# Patient Record
Sex: FEMALE | Race: WHITE | Employment: OTHER | URBAN - METROPOLITAN AREA
[De-identification: names, ages, dates, MRNs, and addresses within clinical notes are randomized per-mention and may not be internally consistent; named-entity substitution may affect disease eponyms.]

---

## 2017-01-14 ENCOUNTER — HOSPITAL ENCOUNTER (OUTPATIENT)
Dept: GENERAL RADIOLOGY | Facility: HOSPITAL | Age: 72
Discharge: HOME | End: 2017-01-14
Attending: FAMILY MEDICINE
Payer: MEDICAID

## 2017-01-14 DIAGNOSIS — M25.469 PAIN AND SWELLING OF KNEE: ICD-10-CM

## 2017-01-14 DIAGNOSIS — M25.569 PAIN AND SWELLING OF KNEE: ICD-10-CM

## 2017-01-14 PROCEDURE — 73560 X-RAY EXAM OF KNEE 1 OR 2: CPT

## 2017-02-11 ENCOUNTER — HOSPITAL ENCOUNTER (EMERGENCY)
Facility: HOSPITAL | Age: 72
Discharge: HOME | End: 2017-02-11
Attending: EMERGENCY MEDICINE
Payer: MEDICAID

## 2017-02-11 ENCOUNTER — APPOINTMENT (OUTPATIENT)
Dept: GENERAL RADIOLOGY | Facility: HOSPITAL | Age: 72
End: 2017-02-11
Attending: EMERGENCY MEDICINE
Payer: MEDICAID

## 2017-02-11 VITALS
BODY MASS INDEX: 27.34 KG/M2 | RESPIRATION RATE: 16 BRPM | HEIGHT: 63 IN | HEART RATE: 80 BPM | TEMPERATURE: 99 F | WEIGHT: 154.31 LBS | OXYGEN SATURATION: 95 % | SYSTOLIC BLOOD PRESSURE: 129 MMHG | DIASTOLIC BLOOD PRESSURE: 59 MMHG

## 2017-02-11 DIAGNOSIS — J40 BRONCHITIS: Primary | ICD-10-CM

## 2017-02-11 DIAGNOSIS — R09.82 ALLERGIC RHINITIS WITH POSTNASAL DRIP: ICD-10-CM

## 2017-02-11 DIAGNOSIS — J30.9 ALLERGIC RHINITIS WITH POSTNASAL DRIP: ICD-10-CM

## 2017-02-11 LAB
ALBUMIN SERPL BCP-MCNC: 3.6 G/DL (ref 3.5–4.8)
ALP SERPL-CCNC: 82 U/L (ref 32–100)
ALT SERPL-CCNC: 19 U/L (ref 14–54)
ANION GAP SERPL CALC-SCNC: 8 MMOL/L (ref 0–18)
AST SERPL-CCNC: 19 U/L (ref 15–41)
BASOPHILS # BLD: 0.1 K/UL (ref 0–0.2)
BASOPHILS NFR BLD: 1 %
BILIRUB DIRECT SERPL-MCNC: 0.2 MG/DL (ref 0–0.2)
BILIRUB SERPL-MCNC: 0.4 MG/DL (ref 0.3–1.2)
BUN SERPL-MCNC: 13 MG/DL (ref 8–20)
BUN/CREAT SERPL: 18.6 (ref 10–20)
CALCIUM SERPL-MCNC: 9.2 MG/DL (ref 8.5–10.5)
CHLORIDE SERPL-SCNC: 105 MMOL/L (ref 95–110)
CO2 SERPL-SCNC: 26 MMOL/L (ref 22–32)
CREAT SERPL-MCNC: 0.7 MG/DL (ref 0.5–1.5)
EOSINOPHIL # BLD: 0.3 K/UL (ref 0–0.7)
EOSINOPHIL NFR BLD: 3 %
ERYTHROCYTE [DISTWIDTH] IN BLOOD BY AUTOMATED COUNT: 12.8 % (ref 11–15)
GLUCOSE SERPL-MCNC: 157 MG/DL (ref 70–99)
HCT VFR BLD AUTO: 43.4 % (ref 35–48)
HGB BLD-MCNC: 14.6 G/DL (ref 12–16)
LYMPHOCYTES # BLD: 1.8 K/UL (ref 1–4)
LYMPHOCYTES NFR BLD: 19 %
MCH RBC QN AUTO: 29.8 PG (ref 27–32)
MCHC RBC AUTO-ENTMCNC: 33.8 G/DL (ref 32–37)
MCV RBC AUTO: 88.2 FL (ref 80–100)
MONOCYTES # BLD: 0.9 K/UL (ref 0–1)
MONOCYTES NFR BLD: 9 %
NEUTROPHILS # BLD AUTO: 6.4 K/UL (ref 1.8–7.7)
NEUTROPHILS NFR BLD: 69 %
OSMOLALITY UR CALC.SUM OF ELEC: 291 MOSM/KG (ref 275–295)
PLATELET # BLD AUTO: 219 K/UL (ref 140–400)
PMV BLD AUTO: 7.9 FL (ref 7.4–10.3)
POTASSIUM SERPL-SCNC: 3.3 MMOL/L (ref 3.3–5.1)
PROT SERPL-MCNC: 6.8 G/DL (ref 5.9–8.4)
RBC # BLD AUTO: 4.92 M/UL (ref 3.7–5.4)
SODIUM SERPL-SCNC: 139 MMOL/L (ref 136–144)
TROPONIN I SERPL-MCNC: 0.01 NG/ML (ref ?–0.03)
WBC # BLD AUTO: 9.4 K/UL (ref 4–11)

## 2017-02-11 PROCEDURE — 36415 COLL VENOUS BLD VENIPUNCTURE: CPT

## 2017-02-11 PROCEDURE — 80076 HEPATIC FUNCTION PANEL: CPT | Performed by: EMERGENCY MEDICINE

## 2017-02-11 PROCEDURE — 93010 ELECTROCARDIOGRAM REPORT: CPT | Performed by: EMERGENCY MEDICINE

## 2017-02-11 PROCEDURE — 71020 XR CHEST PA + LAT CHEST (CPT=71020): CPT

## 2017-02-11 PROCEDURE — 84484 ASSAY OF TROPONIN QUANT: CPT

## 2017-02-11 PROCEDURE — 85025 COMPLETE CBC W/AUTO DIFF WBC: CPT

## 2017-02-11 PROCEDURE — 99284 EMERGENCY DEPT VISIT MOD MDM: CPT

## 2017-02-11 PROCEDURE — 80048 BASIC METABOLIC PNL TOTAL CA: CPT

## 2017-02-11 PROCEDURE — 93005 ELECTROCARDIOGRAM TRACING: CPT

## 2017-02-11 RX ORDER — FLUTICASONE PROPIONATE 50 MCG
2 SPRAY, SUSPENSION (ML) NASAL DAILY
Qty: 16 G | Refills: 0 | Status: SHIPPED | OUTPATIENT
Start: 2017-02-11 | End: 2017-03-13

## 2017-02-11 RX ORDER — LORATADINE 10 MG/1
10 TABLET ORAL DAILY
Qty: 14 TABLET | Refills: 0 | Status: SHIPPED | OUTPATIENT
Start: 2017-02-11 | End: 2017-02-25

## 2017-02-11 RX ORDER — DOXYCYCLINE HYCLATE 100 MG/1
100 CAPSULE ORAL 2 TIMES DAILY
Qty: 14 CAPSULE | Refills: 0 | Status: SHIPPED | OUTPATIENT
Start: 2017-02-11 | End: 2017-02-18

## 2017-02-11 RX ORDER — BENZONATATE 100 MG/1
100 CAPSULE ORAL 3 TIMES DAILY PRN
Qty: 42 CAPSULE | Refills: 0 | Status: SHIPPED | OUTPATIENT
Start: 2017-02-11 | End: 2017-02-25

## 2017-02-11 NOTE — ED PROVIDER NOTES
Patient Seen in: [facility] AND [facility] Emergency Department    History   Patient presents with:  Chest Pain Angina (cardiovascular)  Cough/URI    Stated Complaint: sinus congestion, and buring to chest     HPI    67 yo F with PMH HTN, allergic rhinitis present above.    PSFH elements reviewed from today and agreed except as otherwise stated in HPI.     Physical Exam     ED Triage Vitals   BP 02/11/17 1604 125/72 mmHg   Pulse 02/11/17 1602 82   Resp 02/11/17 1602 18   Temp 02/11/17 1602 98.8 °F (37.1 °C)   Temp sr GREEN   RAINBOW DRAW LAVENDER TALL (BNP)   CBC W/ DIFFERENTIAL      EKG    Rate, intervals and axes as noted on EKG Report.   Rate: 79  Rhythm: Sinus Rhythm  Reading: NSR 79 without ST changes, diffuse precordial TWF unchanged from 12/29/16 as interpreted b close PCP followup.     Disposition and Plan     Clinical Impression:  Bronchitis  (primary encounter diagnosis)  Allergic rhinitis with postnasal drip    Disposition:  Discharge    Follow-up:  Your PCP    Schedule an appointment as soon as possible for a ro

## 2017-02-11 NOTE — ED INITIAL ASSESSMENT (HPI)
Pt hasnt been feeling well since her last ER visit in December pt had a sinus infection and UTI.  Pt now complains of URI symptoms and burning in her chest.

## 2017-02-28 NOTE — ED PROVIDER NOTES
Patient Seen in: [facility] AND [facility] Emergency Department    History   Patient presents with:   Other    Stated Complaint: sinus pressure x1 week, back pain x1 week    HPI    50-year-old female with a history of hypertension who is had multiple recent visit HPI.    Physical Exam       ED Triage Vitals   BP 02/28/17 0556 116/73 mmHg   Pulse 02/28/17 0556 69   Resp 02/28/17 0556 18   Temp 02/28/17 0556 98.8 °F (37.1 °C)   Temp src --    SpO2 02/28/17 0556 99 %   O2 Device 02/28/17 0556 None (Room air)       Cur daughter to call the primary care doctor today and arrange for further follow-up.   For her nonemergent conditions      Disposition and Plan     Clinical Impression:  Anxiety  (primary encounter diagnosis)  Nasal congestion with rhinorrhea    Disposition:

## 2017-02-28 NOTE — ED NOTES
Pt complaining of feeling weak and tired, epigastric pain and back pain.   Pt currently taking augmentin for a a sinus infection and uti, +cough

## 2017-09-25 NOTE — ED AVS SNAPSHOT
Addended by: CHANDANA BROWN on: 9/25/2017 10:41 AM     Modules accepted: Orders     [facility] Emergency Department    [address]     [address] [place] [address]    Phone:  [phone]    Fax:  [phone]           Derrick Webb   MRN: [id number]    Department:  [facility] Emergency Department   Date of Visit:  2/2 coverage and benefits available for follow-up care and referrals. If you have difficulty scheduling your follow-up appointment as directed, please call our  at [phone].      Si tiene problemas para programar bernardo jessica de seguimiento s different from what your Primary Care doctor has instructed you - please continue to take your medications as instructed by your Primary Care doctor until you can check with your doctor. Please bring the medication list to your next doctor's appointment. can help with your Affordable Care Act coverage, as well as all types of Medicaid plans. To get signed up and covered, please call [phone] and ask to get set up for an insurance coverage that is in-network with [facility]

## 2018-01-19 ENCOUNTER — APPOINTMENT (OUTPATIENT)
Dept: GENERAL RADIOLOGY | Facility: HOSPITAL | Age: 73
End: 2018-01-19
Attending: EMERGENCY MEDICINE
Payer: MEDICAID

## 2018-01-19 ENCOUNTER — HOSPITAL ENCOUNTER (EMERGENCY)
Facility: HOSPITAL | Age: 73
Discharge: HOME | End: 2018-01-19
Attending: EMERGENCY MEDICINE
Payer: MEDICAID

## 2018-01-19 ENCOUNTER — APPOINTMENT (OUTPATIENT)
Dept: CT IMAGING | Facility: HOSPITAL | Age: 73
End: 2018-01-19
Attending: EMERGENCY MEDICINE
Payer: MEDICAID

## 2018-01-19 VITALS
DIASTOLIC BLOOD PRESSURE: 75 MMHG | BODY MASS INDEX: 27.34 KG/M2 | TEMPERATURE: 98 F | RESPIRATION RATE: 16 BRPM | OXYGEN SATURATION: 96 % | WEIGHT: 154.31 LBS | HEART RATE: 66 BPM | HEIGHT: 63 IN | SYSTOLIC BLOOD PRESSURE: 130 MMHG

## 2018-01-19 DIAGNOSIS — R10.13 DYSPEPSIA: ICD-10-CM

## 2018-01-19 DIAGNOSIS — H93.12 TINNITUS OF LEFT EAR: ICD-10-CM

## 2018-01-19 DIAGNOSIS — M26.622 ARTHRALGIA OF LEFT TEMPOROMANDIBULAR JOINT: ICD-10-CM

## 2018-01-19 DIAGNOSIS — R42 VERTIGO: Primary | ICD-10-CM

## 2018-01-19 LAB
ANION GAP SERPL CALC-SCNC: 9 MMOL/L (ref 0–18)
BASOPHILS # BLD: 0.1 K/UL (ref 0–0.2)
BASOPHILS NFR BLD: 1 %
BUN SERPL-MCNC: 12 MG/DL (ref 8–20)
BUN/CREAT SERPL: 20.7 (ref 10–20)
CALCIUM SERPL-MCNC: 9.1 MG/DL (ref 8.5–10.5)
CHLORIDE SERPL-SCNC: 106 MMOL/L (ref 95–110)
CO2 SERPL-SCNC: 25 MMOL/L (ref 22–32)
CREAT SERPL-MCNC: 0.58 MG/DL (ref 0.5–1.5)
EOSINOPHIL # BLD: 0.1 K/UL (ref 0–0.7)
EOSINOPHIL NFR BLD: 2 %
ERYTHROCYTE [DISTWIDTH] IN BLOOD BY AUTOMATED COUNT: 12.9 % (ref 11–15)
GLUCOSE SERPL-MCNC: 101 MG/DL (ref 70–99)
HCT VFR BLD AUTO: 42 % (ref 35–48)
HGB BLD-MCNC: 14.2 G/DL (ref 12–16)
LYMPHOCYTES # BLD: 2.1 K/UL (ref 1–4)
LYMPHOCYTES NFR BLD: 27 %
MCH RBC QN AUTO: 30.2 PG (ref 27–32)
MCHC RBC AUTO-ENTMCNC: 33.9 G/DL (ref 32–37)
MCV RBC AUTO: 89.1 FL (ref 80–100)
MONOCYTES # BLD: 0.7 K/UL (ref 0–1)
MONOCYTES NFR BLD: 9 %
NEUTROPHILS # BLD AUTO: 5 K/UL (ref 1.8–7.7)
NEUTROPHILS NFR BLD: 62 %
OSMOLALITY UR CALC.SUM OF ELEC: 290 MOSM/KG (ref 275–295)
PLATELET # BLD AUTO: 256 K/UL (ref 140–400)
PMV BLD AUTO: 7.8 FL (ref 7.4–10.3)
POTASSIUM SERPL-SCNC: 3.3 MMOL/L (ref 3.3–5.1)
RBC # BLD AUTO: 4.71 M/UL (ref 3.7–5.4)
SODIUM SERPL-SCNC: 140 MMOL/L (ref 136–144)
TROPONIN I SERPL-MCNC: 0 NG/ML (ref ?–0.03)
WBC # BLD AUTO: 8.1 K/UL (ref 4–11)

## 2018-01-19 PROCEDURE — 93005 ELECTROCARDIOGRAM TRACING: CPT

## 2018-01-19 PROCEDURE — 71046 X-RAY EXAM CHEST 2 VIEWS: CPT | Performed by: EMERGENCY MEDICINE

## 2018-01-19 PROCEDURE — 70450 CT HEAD/BRAIN W/O DYE: CPT | Performed by: EMERGENCY MEDICINE

## 2018-01-19 PROCEDURE — 80048 BASIC METABOLIC PNL TOTAL CA: CPT | Performed by: EMERGENCY MEDICINE

## 2018-01-19 PROCEDURE — 36415 COLL VENOUS BLD VENIPUNCTURE: CPT

## 2018-01-19 PROCEDURE — 93010 ELECTROCARDIOGRAM REPORT: CPT | Performed by: EMERGENCY MEDICINE

## 2018-01-19 PROCEDURE — 85025 COMPLETE CBC W/AUTO DIFF WBC: CPT | Performed by: EMERGENCY MEDICINE

## 2018-01-19 PROCEDURE — 99285 EMERGENCY DEPT VISIT HI MDM: CPT

## 2018-01-19 PROCEDURE — 84484 ASSAY OF TROPONIN QUANT: CPT | Performed by: EMERGENCY MEDICINE

## 2018-01-19 RX ORDER — AMOXICILLIN AND CLAVULANATE POTASSIUM 875; 125 MG/1; MG/1
1 TABLET, FILM COATED ORAL 2 TIMES DAILY
Qty: 20 TABLET | Refills: 0 | Status: SHIPPED | OUTPATIENT
Start: 2018-01-19 | End: 2018-01-19

## 2018-01-19 RX ORDER — FLUTICASONE PROPIONATE 50 MCG
2 SPRAY, SUSPENSION (ML) NASAL DAILY
Qty: 16 G | Refills: 0 | Status: SHIPPED | OUTPATIENT
Start: 2018-01-19 | End: 2018-02-18

## 2018-01-19 RX ORDER — PANTOPRAZOLE SODIUM 40 MG/1
40 TABLET, DELAYED RELEASE ORAL DAILY
Qty: 14 TABLET | Refills: 0 | Status: SHIPPED | OUTPATIENT
Start: 2018-01-19 | End: 2018-02-02

## 2018-01-19 RX ORDER — MECLIZINE HYDROCHLORIDE 25 MG/1
25 TABLET ORAL 3 TIMES DAILY PRN
Qty: 20 TABLET | Refills: 0 | Status: SHIPPED | OUTPATIENT
Start: 2018-01-19

## 2018-01-19 RX ORDER — ONDANSETRON 4 MG/1
4 TABLET, FILM COATED ORAL EVERY 8 HOURS PRN
Qty: 10 TABLET | Refills: 0 | Status: SHIPPED | OUTPATIENT
Start: 2018-01-19 | End: 2018-03-12 | Stop reason: ALTCHOICE

## 2018-01-19 RX ORDER — TRAMADOL HYDROCHLORIDE 50 MG/1
TABLET ORAL EVERY 8 HOURS PRN
Qty: 16 TABLET | Refills: 0 | Status: SHIPPED | OUTPATIENT
Start: 2018-01-19 | End: 2018-01-26

## 2018-01-19 NOTE — ED PROVIDER NOTES
Patient Seen in: [facility] AND [facility] Emergency Department    History   No chief complaint on file. Stated Complaint: Chest Pain    HPI    History obtained with the patient's daughter interpreting. Patient's speaks [place].     34-year-old female presen 97.9 °F (36.6 °C)  Temp src: Temporal  SpO2: 95 %  O2 Device: None (Room air)    Current:/75   Pulse 66   Temp 97.9 °F (36.6 °C) (Temporal)   Resp 16   Ht 160 cm (5' 3\")   Wt 70 kg   SpO2 96%   BMI 27.34 kg/m²         Physical Exam    Gen: Awake jignesh 73  Rhythm: Sinus Rhythm  Reading: Nonspecific T-wave changes unchanged from previous. ED Course as of Jan 19 1955  ------------------------------------------------------------       MDM     Patient resting comfortably. All symptoms resolved.   Antolin Petty

## 2018-01-19 NOTE — ED INITIAL ASSESSMENT (HPI)
\"Burning\" chest pain, dizziness, sinus pressure, \"losing balance\" and back pain since yesterday.

## 2018-01-19 NOTE — ED NOTES
Patient complaining of a \"burning in her chest,\" blurry vision and sinus pressure that began yesterday. {atient alert and oriented.

## 2018-02-27 NOTE — H&P
[address] [facility] [address] Gastroenterology                                                                                                  Clinic History and Physical     Pa No smoking or alcohol history. No regular NSAID use. The patient's daughter reports that the patient had sinus surgery in the past.  No specifics available in this regard. No history of chronic respiratory conditions.     The patient's daughter also re mouth 3 (three) times daily as needed. Disp: 20 tablet Rfl: 0   diazepam 5 MG Oral Tab Take 1 tablet (5 mg total) by mouth every 12 (twelve) hours as needed for Anxiety.  Disp: 6 tablet Rfl: 0   clotrimazole-betamethasone (LOTRISONE) 1-0.05 % Apply External has a normal mood and affect, behavior is normal    Nursing note and vitals reviewed    Labs/Imaging:     Patient's labs and imaging were reviewed and discussed with patient today. See HPI and A&P for further details.      .  ASSESSMENT/PLAN:   Yordy Cuadra w/ possible biopsy/dilation w/ Dr. Immanuel Marsh with MAC  -Anti-platelets and anti-coagulants: Denies  -Diabetes and hypertensive meds: Continue BP Rx as prescribed    EGD consent: I have discussed the risks, benefits, and alternatives to upper endoscopy/enterosc

## 2018-02-28 ENCOUNTER — TELEPHONE (OUTPATIENT)
Dept: GASTROENTEROLOGY | Facility: CLINIC | Age: 73
End: 2018-02-28

## 2018-02-28 ENCOUNTER — OFFICE VISIT (OUTPATIENT)
Dept: GASTROENTEROLOGY | Facility: CLINIC | Age: 73
End: 2018-02-28

## 2018-02-28 VITALS
HEIGHT: 62 IN | BODY MASS INDEX: 26.68 KG/M2 | DIASTOLIC BLOOD PRESSURE: 78 MMHG | WEIGHT: 145 LBS | HEART RATE: 84 BPM | SYSTOLIC BLOOD PRESSURE: 115 MMHG

## 2018-02-28 DIAGNOSIS — K21.9 GASTROESOPHAGEAL REFLUX DISEASE, ESOPHAGITIS PRESENCE NOT SPECIFIED: ICD-10-CM

## 2018-02-28 DIAGNOSIS — R05.8 PRODUCTIVE COUGH: ICD-10-CM

## 2018-02-28 DIAGNOSIS — R10.13 EPIGASTRIC PAIN: Primary | ICD-10-CM

## 2018-02-28 DIAGNOSIS — R19.8 GLOBUS SENSATION: ICD-10-CM

## 2018-02-28 DIAGNOSIS — R10.84 GENERALIZED ABDOMINAL PAIN: Primary | ICD-10-CM

## 2018-02-28 DIAGNOSIS — R10.13 EPIGASTRIC PAIN: ICD-10-CM

## 2018-02-28 PROCEDURE — 99244 OFF/OP CNSLTJ NEW/EST MOD 40: CPT | Performed by: NURSE PRACTITIONER

## 2018-02-28 PROCEDURE — 99212 OFFICE O/P EST SF 10 MIN: CPT | Performed by: NURSE PRACTITIONER

## 2018-02-28 RX ORDER — OMEPRAZOLE 40 MG/1
40 CAPSULE, DELAYED RELEASE ORAL DAILY
COMMUNITY
End: 2018-02-28

## 2018-02-28 RX ORDER — SUCRALFATE 1 G/1
1 TABLET ORAL
Qty: 90 TABLET | Refills: 1 | Status: SHIPPED | OUTPATIENT
Start: 2018-02-28 | End: 2018-03-30

## 2018-02-28 RX ORDER — OMEPRAZOLE 40 MG/1
40 CAPSULE, DELAYED RELEASE ORAL 2 TIMES DAILY
Qty: 60 CAPSULE | Refills: 2 | Status: SHIPPED | OUTPATIENT
Start: 2018-02-28 | End: 2018-03-30

## 2018-02-28 NOTE — TELEPHONE ENCOUNTER
Scheduled for:  EGD w/poss dil 77026  Provider Name: Dr. Celestina Cerna  Date:  3/7/18  Location:  [facility]  Sedation:  MAC  Time:  11:00 am, arrival 10:00 am  Prep: Clear liquids after midnight; NPO after 8:00 am  Meds/Allergies Reconciled?:  GALO Garibay reviewed

## 2018-02-28 NOTE — PATIENT INSTRUCTIONS
1. Schedule upper endoscopy (EGD) w/ possible dilation and biopsy with Dr. Karen Loya w/ MAC  2. Increase omeprazole 40 twice daily  3. Dissolve Carafate in water up to 4x day as needed for abdominal pain   4.   Complete CT of the abdomen

## 2018-03-05 ENCOUNTER — APPOINTMENT (OUTPATIENT)
Dept: GENERAL RADIOLOGY | Facility: HOSPITAL | Age: 73
End: 2018-03-05
Attending: EMERGENCY MEDICINE
Payer: MEDICAID

## 2018-03-05 PROCEDURE — 71046 X-RAY EXAM CHEST 2 VIEWS: CPT | Performed by: EMERGENCY MEDICINE

## 2018-03-05 NOTE — ED INITIAL ASSESSMENT (HPI)
Pt w/ multiple complaints including weakness, abd pain, pain that starts at the back of her head and radiates down throughout her body. N/t to hands/feet.  Pt pt's daughter, symptoms started Saturday however she has had similar episodes multiple times since

## 2018-03-05 NOTE — ED PROVIDER NOTES
Patient Seen in: [facility] AND [facility] Emergency Department    History   Patient presents with:  Weakness    Stated Complaint:     HPI    Patient presents with complaint of multiple complaints.   Patient has been here a few times in the past year for similar total) by mouth every 8 (eight) hours as needed for Nausea. Meclizine HCl 25 MG Oral Tab,  Take 1 tablet (25 mg total) by mouth 3 (three) times daily as needed.    diazepam 5 MG Oral Tab,  Take 1 tablet (5 mg total) by mouth every 12 (twelve) hours as nee normal, no lesions. HEENT: Oropharynx shows no redness no pus tolerating secretions tympanic membranes no redness no bulging neck is supple without stridor  Cardiovascular:  Normal S1 and S2, no murmur, regular, with good peripheral perfusion.   Respirator DIFFERENTIAL WITH PLATELET    Narrative: The following orders were created for panel order CBC WITH DIFFERENTIAL WITH PLATELET.   Procedure                               Abnormality         Status                     ---------

## 2018-03-07 ENCOUNTER — HOSPITAL ENCOUNTER (OUTPATIENT)
Facility: HOSPITAL | Age: 73
Setting detail: HOSPITAL OUTPATIENT SURGERY
Discharge: HOME | End: 2018-03-07
Attending: INTERNAL MEDICINE | Admitting: INTERNAL MEDICINE
Payer: MEDICAID

## 2018-03-07 ENCOUNTER — ANESTHESIA EVENT (OUTPATIENT)
Dept: ENDOSCOPY | Facility: HOSPITAL | Age: 73
End: 2018-03-07

## 2018-03-07 ENCOUNTER — ANESTHESIA (OUTPATIENT)
Dept: ENDOSCOPY | Facility: HOSPITAL | Age: 73
End: 2018-03-07

## 2018-03-07 ENCOUNTER — SURGERY (OUTPATIENT)
Age: 73
End: 2018-03-07

## 2018-03-07 DIAGNOSIS — K31.9 GASTRIC ERYTHEMA: Primary | ICD-10-CM

## 2018-03-07 DIAGNOSIS — K21.9 GASTROESOPHAGEAL REFLUX DISEASE, ESOPHAGITIS PRESENCE NOT SPECIFIED: ICD-10-CM

## 2018-03-07 DIAGNOSIS — K44.9 HIATAL HERNIA WITHOUT GANGRENE AND OBSTRUCTION: ICD-10-CM

## 2018-03-07 DIAGNOSIS — R05.8 PRODUCTIVE COUGH: ICD-10-CM

## 2018-03-07 DIAGNOSIS — R19.8 GLOBUS SENSATION: ICD-10-CM

## 2018-03-07 DIAGNOSIS — R10.13 EPIGASTRIC PAIN: ICD-10-CM

## 2018-03-07 PROCEDURE — 0D718ZZ DILATION OF UPPER ESOPHAGUS, VIA NATURAL OR ARTIFICIAL OPENING ENDOSCOPIC: ICD-10-PCS | Performed by: INTERNAL MEDICINE

## 2018-03-07 PROCEDURE — 43239 EGD BIOPSY SINGLE/MULTIPLE: CPT | Performed by: INTERNAL MEDICINE

## 2018-03-07 PROCEDURE — 0DB68ZX EXCISION OF STOMACH, VIA NATURAL OR ARTIFICIAL OPENING ENDOSCOPIC, DIAGNOSTIC: ICD-10-PCS | Performed by: INTERNAL MEDICINE

## 2018-03-07 PROCEDURE — 43248 EGD GUIDE WIRE INSERTION: CPT | Performed by: INTERNAL MEDICINE

## 2018-03-07 PROCEDURE — 0DB98ZX EXCISION OF DUODENUM, VIA NATURAL OR ARTIFICIAL OPENING ENDOSCOPIC, DIAGNOSTIC: ICD-10-PCS | Performed by: INTERNAL MEDICINE

## 2018-03-07 PROCEDURE — 0DB18ZX EXCISION OF UPPER ESOPHAGUS, VIA NATURAL OR ARTIFICIAL OPENING ENDOSCOPIC, DIAGNOSTIC: ICD-10-PCS | Performed by: INTERNAL MEDICINE

## 2018-03-07 RX ORDER — LIDOCAINE HYDROCHLORIDE 10 MG/ML
INJECTION, SOLUTION EPIDURAL; INFILTRATION; INTRACAUDAL; PERINEURAL AS NEEDED
Status: DISCONTINUED | OUTPATIENT
Start: 2018-03-07 | End: 2018-03-07 | Stop reason: SURG

## 2018-03-07 RX ORDER — SODIUM CHLORIDE, SODIUM LACTATE, POTASSIUM CHLORIDE, CALCIUM CHLORIDE 600; 310; 30; 20 MG/100ML; MG/100ML; MG/100ML; MG/100ML
INJECTION, SOLUTION INTRAVENOUS CONTINUOUS
Status: DISCONTINUED | OUTPATIENT
Start: 2018-03-07 | End: 2018-03-07

## 2018-03-07 RX ADMIN — SODIUM CHLORIDE, SODIUM LACTATE, POTASSIUM CHLORIDE, CALCIUM CHLORIDE: 600; 310; 30; 20 INJECTION, SOLUTION INTRAVENOUS at 11:18:00

## 2018-03-07 RX ADMIN — LIDOCAINE HYDROCHLORIDE 100 MG: 10 INJECTION, SOLUTION EPIDURAL; INFILTRATION; INTRACAUDAL; PERINEURAL at 11:18:00

## 2018-03-07 RX ADMIN — SODIUM CHLORIDE, SODIUM LACTATE, POTASSIUM CHLORIDE, CALCIUM CHLORIDE: 600; 310; 30; 20 INJECTION, SOLUTION INTRAVENOUS at 11:36:00

## 2018-03-07 NOTE — H&P
History & Physical Examination    Patient Name: Maritza Johnson  MRN: [id number]  CSN: [id number]  YOB: 1945    Diagnosis: GERD, dyspepsia, epigastric pain, globus sensation      Prescriptions Prior to Admission:  LORazepam 0.5 MG Oral Tab T status: Never Smoker    Smokeless tobacco: Never Used    Alcohol use No       SYSTEM Check if Physical Exam is Normal If not normal, please explain:   NATHALIE [ Leila Walsh [ Hoang Moody [ Carmel Hoang [ Petra Grief [ X]      I have disc

## 2018-03-07 NOTE — OPERATIVE REPORT
ESOPHAGOGASTRODUODENOSCOPY (EGD) REPORT    Mally Coombs     3/19/1945 Age 67year old   PCP Khari French MD Endoscopist Terrie Munoz MD     Date of procedure: 18    Procedure: EGD w/cold biopsy and guidewire (Savary) dilatation    Pr folds were seen. The pylorus was patent. The gastric mucosa appeared mildly erythematous s/p random biopsies. Retroflexion revealed a normal fundus and a mildly-patulous cardia.      3. Duodenum: The duodenal mucosa appeared normal in the 1st and 2nd portio

## 2018-03-07 NOTE — ANESTHESIA PREPROCEDURE EVALUATION
Anesthesia PreOp Note    HPI:     Aaron Prince is a 67year old female who presents for preoperative consultation requested by: Juana Han MD    Date of Surgery: 3/7/2018    Procedure(s):  ESOPHAGOGASTRODUODENOSCOPY (EGD)  Indication: Epigastric Cream Apply 1 Application topically 2 (two) times daily.  Disp: 45 g Rfl: 0 Not Taking       Current Facility-Administered Medications Ordered in Epic:  lactated ringers infusion  Intravenous Continuous Joe Lovelace MD     No current Epic-ordered outpat Cardiovascular - normal exam  (+) hypertension,     Neuro/Psych - negative ROS     GI/Hepatic/Renal    (+) GERD,     Endo/Other - negative ROS   Abdominal  - normal exam             Anesthesia Plan:   ASA:  2  Plan:   MAC  Post-op Pain Management: IV analg

## 2018-03-07 NOTE — ANESTHESIA POSTPROCEDURE EVALUATION
Patient: Carey Tavera    Procedure Summary     Date:  03/07/18 Room / Location:  [address] [place] [address] ENDOSCOPY 01 / [address] [place] [address] ENDOSCOPY    Anesthesia Start:  7816 Anesthesia Stop:  4877    Procedure:  ESOPHAGOGASTRODUODENOSCOPY (EGD) (N/A ) Diagnosis:       Epigastric pain

## 2018-03-08 VITALS
HEART RATE: 74 BPM | SYSTOLIC BLOOD PRESSURE: 128 MMHG | BODY MASS INDEX: 26.68 KG/M2 | WEIGHT: 145 LBS | OXYGEN SATURATION: 98 % | DIASTOLIC BLOOD PRESSURE: 92 MMHG | RESPIRATION RATE: 23 BRPM | HEIGHT: 62 IN

## 2018-03-09 ENCOUNTER — HOSPITAL ENCOUNTER (EMERGENCY)
Facility: HOSPITAL | Age: 73
Discharge: HOME | End: 2018-03-09
Attending: EMERGENCY MEDICINE
Payer: MEDICAID

## 2018-03-09 ENCOUNTER — APPOINTMENT (OUTPATIENT)
Dept: GENERAL RADIOLOGY | Facility: HOSPITAL | Age: 73
End: 2018-03-09
Payer: MEDICAID

## 2018-03-09 ENCOUNTER — APPOINTMENT (OUTPATIENT)
Dept: CT IMAGING | Facility: HOSPITAL | Age: 73
End: 2018-03-09
Attending: EMERGENCY MEDICINE
Payer: MEDICAID

## 2018-03-09 VITALS
RESPIRATION RATE: 18 BRPM | WEIGHT: 154.31 LBS | OXYGEN SATURATION: 97 % | SYSTOLIC BLOOD PRESSURE: 141 MMHG | TEMPERATURE: 97 F | DIASTOLIC BLOOD PRESSURE: 78 MMHG | HEART RATE: 72 BPM | BODY MASS INDEX: 30.29 KG/M2 | HEIGHT: 59.84 IN

## 2018-03-09 DIAGNOSIS — R55 VASOVAGAL EPISODE: ICD-10-CM

## 2018-03-09 DIAGNOSIS — R05.9 COUGH: Primary | ICD-10-CM

## 2018-03-09 DIAGNOSIS — N30.00 ACUTE CYSTITIS WITHOUT HEMATURIA: ICD-10-CM

## 2018-03-09 LAB
ANION GAP SERPL CALC-SCNC: 11 MMOL/L (ref 0–18)
BASOPHILS # BLD: 0.1 K/UL (ref 0–0.2)
BASOPHILS NFR BLD: 1 %
BILIRUB UR QL: NEGATIVE
BUN SERPL-MCNC: 10 MG/DL (ref 8–20)
BUN/CREAT SERPL: 13 (ref 10–20)
CALCIUM SERPL-MCNC: 9.4 MG/DL (ref 8.5–10.5)
CHLORIDE SERPL-SCNC: 105 MMOL/L (ref 95–110)
CO2 SERPL-SCNC: 25 MMOL/L (ref 22–32)
COLOR UR: YELLOW
CREAT SERPL-MCNC: 0.77 MG/DL (ref 0.5–1.5)
EOSINOPHIL # BLD: 0.1 K/UL (ref 0–0.7)
EOSINOPHIL NFR BLD: 1 %
ERYTHROCYTE [DISTWIDTH] IN BLOOD BY AUTOMATED COUNT: 12.7 % (ref 11–15)
GLUCOSE SERPL-MCNC: 112 MG/DL (ref 70–99)
GLUCOSE UR-MCNC: NEGATIVE MG/DL
HCT VFR BLD AUTO: 41 % (ref 35–48)
HGB BLD-MCNC: 13.9 G/DL (ref 12–16)
KETONES UR-MCNC: 20 MG/DL
LYMPHOCYTES # BLD: 1.7 K/UL (ref 1–4)
LYMPHOCYTES NFR BLD: 20 %
MAGNESIUM SERPL-MCNC: 2 MG/DL (ref 1.8–2.5)
MCH RBC QN AUTO: 29.8 PG (ref 27–32)
MCHC RBC AUTO-ENTMCNC: 33.9 G/DL (ref 32–37)
MCV RBC AUTO: 87.9 FL (ref 80–100)
MONOCYTES # BLD: 0.6 K/UL (ref 0–1)
MONOCYTES NFR BLD: 7 %
NEUTROPHILS # BLD AUTO: 6.2 K/UL (ref 1.8–7.7)
NEUTROPHILS NFR BLD: 71 %
NITRITE UR QL STRIP.AUTO: NEGATIVE
OSMOLALITY UR CALC.SUM OF ELEC: 292 MOSM/KG (ref 275–295)
PH UR: 8 [PH] (ref 5–8)
PLATELET # BLD AUTO: 226 K/UL (ref 140–400)
PMV BLD AUTO: 7.7 FL (ref 7.4–10.3)
POTASSIUM SERPL-SCNC: 3.5 MMOL/L (ref 3.3–5.1)
PROT UR-MCNC: NEGATIVE MG/DL
RBC # BLD AUTO: 4.67 M/UL (ref 3.7–5.4)
RBC #/AREA URNS AUTO: 2 /HPF
SODIUM SERPL-SCNC: 141 MMOL/L (ref 136–144)
SP GR UR STRIP: 1.01 (ref 1–1.03)
TROPONIN I SERPL-MCNC: 0.01 NG/ML (ref ?–0.03)
UROBILINOGEN UR STRIP-ACNC: <2
VIT C UR-MCNC: NEGATIVE MG/DL
WBC # BLD AUTO: 8.7 K/UL (ref 4–11)
WBC #/AREA URNS AUTO: 6 /HPF

## 2018-03-09 PROCEDURE — 70450 CT HEAD/BRAIN W/O DYE: CPT | Performed by: EMERGENCY MEDICINE

## 2018-03-09 PROCEDURE — 85025 COMPLETE CBC W/AUTO DIFF WBC: CPT | Performed by: EMERGENCY MEDICINE

## 2018-03-09 PROCEDURE — 85025 COMPLETE CBC W/AUTO DIFF WBC: CPT

## 2018-03-09 PROCEDURE — 71045 X-RAY EXAM CHEST 1 VIEW: CPT | Performed by: EMERGENCY MEDICINE

## 2018-03-09 PROCEDURE — 36415 COLL VENOUS BLD VENIPUNCTURE: CPT

## 2018-03-09 PROCEDURE — 99285 EMERGENCY DEPT VISIT HI MDM: CPT

## 2018-03-09 PROCEDURE — 83735 ASSAY OF MAGNESIUM: CPT | Performed by: EMERGENCY MEDICINE

## 2018-03-09 PROCEDURE — 80048 BASIC METABOLIC PNL TOTAL CA: CPT

## 2018-03-09 PROCEDURE — 81001 URINALYSIS AUTO W/SCOPE: CPT | Performed by: EMERGENCY MEDICINE

## 2018-03-09 PROCEDURE — 84484 ASSAY OF TROPONIN QUANT: CPT | Performed by: EMERGENCY MEDICINE

## 2018-03-09 PROCEDURE — 80048 BASIC METABOLIC PNL TOTAL CA: CPT | Performed by: EMERGENCY MEDICINE

## 2018-03-09 PROCEDURE — 93010 ELECTROCARDIOGRAM REPORT: CPT | Performed by: EMERGENCY MEDICINE

## 2018-03-09 PROCEDURE — 93005 ELECTROCARDIOGRAM TRACING: CPT

## 2018-03-09 PROCEDURE — 87086 URINE CULTURE/COLONY COUNT: CPT | Performed by: EMERGENCY MEDICINE

## 2018-03-09 RX ORDER — NITROFURANTOIN 25; 75 MG/1; MG/1
100 CAPSULE ORAL 2 TIMES DAILY
Qty: 10 CAPSULE | Refills: 0 | Status: SHIPPED | OUTPATIENT
Start: 2018-03-09 | End: 2018-03-14

## 2018-03-09 RX ORDER — DIAZEPAM 5 MG/1
5 TABLET ORAL EVERY 8 HOURS PRN
Qty: 15 TABLET | Refills: 0 | Status: SHIPPED | OUTPATIENT
Start: 2018-03-09 | End: 2018-03-12

## 2018-03-09 RX ORDER — NITROFURANTOIN 25; 75 MG/1; MG/1
100 CAPSULE ORAL ONCE
Status: COMPLETED | OUTPATIENT
Start: 2018-03-09 | End: 2018-03-09

## 2018-03-09 RX ORDER — BENZONATATE 100 MG/1
100 CAPSULE ORAL 3 TIMES DAILY PRN
Qty: 30 CAPSULE | Refills: 0 | Status: SHIPPED | OUTPATIENT
Start: 2018-03-09 | End: 2018-04-08

## 2018-03-09 NOTE — ED INITIAL ASSESSMENT (HPI)
Pt had a syncopal episode while walking to the couch. Denies hitting head. +LOC. Pt also reports sob. Denies cp. C/o headache and left leg numbness.

## 2018-03-10 NOTE — ED PROVIDER NOTES
Patient Seen in: [facility] AND [facility] Emergency Department    History   No chief complaint on file.     Stated Complaint: Syncope     HPI    66 yo F with PMH HTN, GERD, anxiety presenting for evaluation of chronic cough - markedly worse this afternoon while on No                Review of Systems :  Constitutional: As per HPI  Respiratory: (+) cough. Cardiovascular: Negative for chest pain and palpitations. Positive for stated complaint: Syncope  Other systems are as noted in HPI.   Constitutional and vital s Abnormality         Status                     ---------                               -----------         ------                     CBC W/ DIFFERENTIAL[[id number]]                              Final result                 Please view results denies head trauma. HA.  TECHNIQUE: CT images were obtained without contrast material.  Automated exposure control for dose reduction was used. FINDINGS:  CSF SPACES: Ventricles, cisterns, and sulci are appropriate for age.   No hydrocephalus, subarachnoi Approved by (CST): Lola Schmitt MD on 3/09/2018 at 17:55          CONCLUSION: Normal examination.           MDM     DIFFERENTIAL DIAGNOSIS: After history and physical exam differential diagnosis includes but is not limited to sinusitis, ICH, PNA,

## 2018-03-10 NOTE — ED NOTES
Pt arrives to ed48 from home w/ family for syncopal episode at home. Per pt's daughter, pt felt faint, and passed out while walking. Was assisted to ground. Pt's left arm/leg felt weak and numb after incident. CSS negative.  Pt w/ productive cough as well a

## 2018-03-12 ENCOUNTER — HOSPITAL ENCOUNTER (OUTPATIENT)
Dept: CT IMAGING | Facility: HOSPITAL | Age: 73
Discharge: HOME | End: 2018-03-12
Attending: NURSE PRACTITIONER
Payer: MEDICAID

## 2018-03-12 ENCOUNTER — TELEPHONE (OUTPATIENT)
Dept: GASTROENTEROLOGY | Facility: CLINIC | Age: 73
End: 2018-03-12

## 2018-03-12 DIAGNOSIS — R93.5 ABNORMAL CT OF THE ABDOMEN: Primary | ICD-10-CM

## 2018-03-12 DIAGNOSIS — R10.84 GENERALIZED ABDOMINAL PAIN: ICD-10-CM

## 2018-03-12 PROBLEM — N28.89 LEFT KIDNEY MASS: Status: ACTIVE | Noted: 2018-03-12

## 2018-03-12 PROBLEM — H93.12 SUBJECTIVE TINNITUS, LEFT: Status: ACTIVE | Noted: 2018-03-12

## 2018-03-12 PROCEDURE — 74177 CT ABD & PELVIS W/CONTRAST: CPT | Performed by: NURSE PRACTITIONER

## 2018-03-13 NOTE — TELEPHONE ENCOUNTER
Laurita Mendez      3/13/18 12:29 PM   Note      Daughter calling for results from CT SCAN of Abdomen taken yesterday.  Please call thank you [phone]

## 2018-03-13 NOTE — TELEPHONE ENCOUNTER
I spoke with the patient's daughter. She reports her mother is continuing PPI therapy as prescribed. She has stopped taking Carafate as it did not agree with her. She has been using Gaviscon as directed by Dr. Santosh Petty.   He has noticed some mild improvemen

## 2018-03-13 NOTE — PROGRESS NOTES
HPI:    Patient ID: Laura Martines is a 67year old female. Patient presents for follow up from ER visit and [address] [place] [address] 3/9/18 and  3/5/18 for panic-like symptoms. Her daughter is with her today and translating for patient.   She states patient has been unde daily. Disp: 10 capsule Rfl: 0   benzonatate 100 MG Oral Cap Take 1 capsule (100 mg total) by mouth 3 (three) times daily as needed for cough.  Disp: 30 capsule Rfl: 0   Omeprazole 40 MG Oral Capsule Delayed Release Take 1 capsule (40 mg total) by mouth 2 ( cervical adenopathy. Neurological: She is alert and oriented to person, place, and time. No cranial nerve deficit. Skin: Skin is warm and dry. Psychiatric: She has a normal mood and affect.  Her behavior is normal. Judgment and thought content normal.

## 2018-03-14 NOTE — TELEPHONE ENCOUNTER
Scheduled for:  Colonoscopy 84489  Provider Name: Dr. Don Hughes  Date:  4/18/18  Location:  [facility]  Sedation:  MAC  Time:  0830 (pt is aware to arrive at 0730)   Prep:  Colyte, mailed 3/15/18   Meds/Allergies Reconciled?:  Yeny/GALO reviewed   Diagnosis with cod

## 2018-03-15 ENCOUNTER — TELEPHONE (OUTPATIENT)
Dept: OTHER | Age: 73
End: 2018-03-15

## 2018-03-15 NOTE — TELEPHONE ENCOUNTER
Pt's daughter called stated she have some questions from [address] [place] [address] advised not in office but to take statement stated she will call tomorrow

## 2018-03-16 ENCOUNTER — OFFICE VISIT (OUTPATIENT)
Dept: AUDIOLOGY | Facility: CLINIC | Age: 73
End: 2018-03-16

## 2018-03-16 ENCOUNTER — OFFICE VISIT (OUTPATIENT)
Dept: OTOLARYNGOLOGY | Facility: CLINIC | Age: 73
End: 2018-03-16

## 2018-03-16 VITALS
SYSTOLIC BLOOD PRESSURE: 120 MMHG | DIASTOLIC BLOOD PRESSURE: 70 MMHG | WEIGHT: 147 LBS | TEMPERATURE: 97 F | HEIGHT: 62 IN | BODY MASS INDEX: 27.05 KG/M2

## 2018-03-16 DIAGNOSIS — H93.11 RINGING IN THE EAR, RIGHT: Primary | ICD-10-CM

## 2018-03-16 DIAGNOSIS — R05.9 COUGH: ICD-10-CM

## 2018-03-16 DIAGNOSIS — H90.3 SENSORINEURAL HEARING LOSS, BILATERAL: Primary | ICD-10-CM

## 2018-03-16 PROCEDURE — 92557 COMPREHENSIVE HEARING TEST: CPT | Performed by: AUDIOLOGIST

## 2018-03-16 PROCEDURE — 99243 OFF/OP CNSLTJ NEW/EST LOW 30: CPT | Performed by: OTOLARYNGOLOGY

## 2018-03-16 PROCEDURE — 92567 TYMPANOMETRY: CPT | Performed by: AUDIOLOGIST

## 2018-03-16 PROCEDURE — 99212 OFFICE O/P EST SF 10 MIN: CPT | Performed by: OTOLARYNGOLOGY

## 2018-03-16 RX ORDER — TRAMADOL HYDROCHLORIDE 50 MG/1
TABLET ORAL
Refills: 1 | COMMUNITY
Start: 2018-02-28

## 2018-03-16 RX ORDER — FLUTICASONE PROPIONATE 50 MCG
2 SPRAY, SUSPENSION (ML) NASAL DAILY
Qty: 1 BOTTLE | Refills: 3 | Status: SHIPPED | OUTPATIENT
Start: 2018-03-16

## 2018-03-16 RX ORDER — MONTELUKAST SODIUM 10 MG/1
10 TABLET ORAL NIGHTLY
Qty: 30 TABLET | Refills: 3 | Status: SHIPPED | OUTPATIENT
Start: 2018-03-16

## 2018-03-16 NOTE — PROGRESS NOTES
Yvette Eid is a 67year old female.  Patient presents with:  Sinus Problem: on and off sinus pressure for 1.5 year  Ringing In Ear: on and off ringing of the right ear    HPI:   She has had problems with pressure in the left side of her face extending sucralfate (CARAFATE) 1 g Oral Tab Take 1 tablet (1 g total) by mouth 4 (four) times daily before meals and nightly. Disp: 90 tablet Rfl: 1   Meclizine HCl 25 MG Oral Tab Take 1 tablet (25 mg total) by mouth 3 (three) times daily as needed.  Disp: 20 tabl Normal, Left: Normal.    Ears Normal Inspection - Right: Normal, Left: Normal. Canal - Left: Normal. TM - Right: Normal, Left: Normal.     ASSESSMENT AND PLAN:   1.  Ringing in the ear, right  She has some tinnitus in her ears with a mild high-frequency hea

## 2018-03-16 NOTE — PROGRESS NOTES
AUDIOLOGY REPORT      Emily Cagle is a 67year old female     Referring Provider: Gurinder    YOB: 1945  Medical Record: [id number]      Patient Hearing History:  Patient reported tinnitus. Test/Procedures:    The patient was joann

## 2018-03-16 NOTE — TELEPHONE ENCOUNTER
Spoke with patient's daughter. She saw ENT Dr. Debbie Zapata who started her on Singulair and was told symptoms may be due to allergies. She is doing well on Paxil and reports no side effects.   She did have panic attack last night and improved after taking diazep

## 2018-03-26 RX ORDER — PAROXETINE 10 MG/1
10 TABLET, FILM COATED ORAL EVERY MORNING
Qty: 30 TABLET | Refills: 0 | OUTPATIENT
Start: 2018-03-26

## 2018-04-10 ENCOUNTER — TELEPHONE (OUTPATIENT)
Dept: FAMILY MEDICINE CLINIC | Facility: CLINIC | Age: 73
End: 2018-04-10

## 2018-04-11 RX ORDER — PAROXETINE 10 MG/1
TABLET, FILM COATED ORAL
Qty: 30 TABLET | Refills: 0 | Status: SHIPPED | OUTPATIENT
Start: 2018-04-11

## 2018-04-11 NOTE — TELEPHONE ENCOUNTER
Spoke with daughter and states, \"My mom went back home to [place] for a few months and will get treatment there. She does not need refills right now. \"

## 2018-04-11 NOTE — TELEPHONE ENCOUNTER
Medication refilled for 90 days per protocol.     Please reply to pool: EM CALL [facility]--please schedule follow up with MANOJ Wright      Psychiatric Non-Scheduled (Anti-Anxiety) Passed4/10 3:59 PM   Appointment in last 6 or next 3 months

## 2021-03-08 DIAGNOSIS — Z23 NEED FOR VACCINATION: ICD-10-CM

## 2023-02-21 ENCOUNTER — OFFICE VISIT (OUTPATIENT)
Dept: INTERNAL MEDICINE CLINIC | Facility: CLINIC | Age: 78
End: 2023-02-21

## 2023-02-21 VITALS
HEART RATE: 75 BPM | OXYGEN SATURATION: 98 % | SYSTOLIC BLOOD PRESSURE: 146 MMHG | BODY MASS INDEX: 32.39 KG/M2 | HEIGHT: 62 IN | DIASTOLIC BLOOD PRESSURE: 80 MMHG | WEIGHT: 176 LBS

## 2023-02-21 DIAGNOSIS — R10.13 EPIGASTRIC PAIN: ICD-10-CM

## 2023-02-21 DIAGNOSIS — M17.0 BILATERAL PRIMARY OSTEOARTHRITIS OF KNEE: Primary | ICD-10-CM

## 2023-02-21 DIAGNOSIS — Z85.038 HISTORY OF COLON CANCER: ICD-10-CM

## 2023-02-21 DIAGNOSIS — K21.9 GASTROESOPHAGEAL REFLUX DISEASE WITHOUT ESOPHAGITIS: ICD-10-CM

## 2023-02-21 PROBLEM — F41.9 ANXIETY: Status: ACTIVE | Noted: 2023-02-21

## 2023-02-21 PROCEDURE — 3079F DIAST BP 80-89 MM HG: CPT | Performed by: INTERNAL MEDICINE

## 2023-02-21 PROCEDURE — 3077F SYST BP >= 140 MM HG: CPT | Performed by: INTERNAL MEDICINE

## 2023-02-21 PROCEDURE — 99204 OFFICE O/P NEW MOD 45 MIN: CPT | Performed by: INTERNAL MEDICINE

## 2023-02-21 PROCEDURE — 3008F BODY MASS INDEX DOCD: CPT | Performed by: INTERNAL MEDICINE

## 2023-02-21 RX ORDER — PANTOPRAZOLE SODIUM 40 MG/1
40 TABLET, DELAYED RELEASE ORAL
Qty: 30 TABLET | Refills: 0 | Status: SHIPPED | OUTPATIENT
Start: 2023-02-21

## 2023-02-21 RX ORDER — MELOXICAM 15 MG/1
15 TABLET ORAL DAILY
Qty: 30 TABLET | Refills: 0 | Status: SHIPPED | OUTPATIENT
Start: 2023-02-21

## 2023-02-24 ENCOUNTER — HOSPITAL ENCOUNTER (OUTPATIENT)
Dept: GENERAL RADIOLOGY | Facility: HOSPITAL | Age: 78
Discharge: HOME | End: 2023-02-24
Attending: INTERNAL MEDICINE
Payer: MEDICAID

## 2023-02-24 DIAGNOSIS — M17.0 BILATERAL PRIMARY OSTEOARTHRITIS OF KNEE: ICD-10-CM

## 2023-02-24 PROCEDURE — 73560 X-RAY EXAM OF KNEE 1 OR 2: CPT | Performed by: INTERNAL MEDICINE

## 2023-03-02 ENCOUNTER — OFFICE VISIT (OUTPATIENT)
Dept: INTERNAL MEDICINE CLINIC | Facility: CLINIC | Age: 78
End: 2023-03-02

## 2023-03-02 ENCOUNTER — MED REC SCAN ONLY (OUTPATIENT)
Dept: INTERNAL MEDICINE CLINIC | Facility: CLINIC | Age: 78
End: 2023-03-02

## 2023-03-02 VITALS
BODY MASS INDEX: 32.57 KG/M2 | SYSTOLIC BLOOD PRESSURE: 138 MMHG | OXYGEN SATURATION: 99 % | DIASTOLIC BLOOD PRESSURE: 72 MMHG | HEART RATE: 82 BPM | WEIGHT: 177 LBS | HEIGHT: 62 IN

## 2023-03-02 DIAGNOSIS — M17.11 PRIMARY OSTEOARTHRITIS OF RIGHT KNEE: ICD-10-CM

## 2023-03-02 DIAGNOSIS — Z23 NEED FOR VACCINATION FOR PNEUMOCOCCUS: ICD-10-CM

## 2023-03-02 DIAGNOSIS — Z13.0 SCREENING FOR DEFICIENCY ANEMIA: ICD-10-CM

## 2023-03-02 DIAGNOSIS — Z85.038 HISTORY OF COLON CANCER: ICD-10-CM

## 2023-03-02 DIAGNOSIS — Z00.00 WELLNESS EXAMINATION: Primary | ICD-10-CM

## 2023-03-02 DIAGNOSIS — R53.83 FATIGUE, UNSPECIFIED TYPE: ICD-10-CM

## 2023-03-02 DIAGNOSIS — Z13.220 LIPID SCREENING: ICD-10-CM

## 2023-03-02 DIAGNOSIS — Z13.21 ENCOUNTER FOR VITAMIN DEFICIENCY SCREENING: ICD-10-CM

## 2023-03-02 DIAGNOSIS — R73.09 ELEVATED GLUCOSE: ICD-10-CM

## 2023-03-02 DIAGNOSIS — Z13.820 OSTEOPOROSIS SCREENING: ICD-10-CM

## 2023-03-02 DIAGNOSIS — Z13.29 THYROID DISORDER SCREEN: ICD-10-CM

## 2023-03-02 DIAGNOSIS — M17.12 PRIMARY OSTEOARTHRITIS OF LEFT KNEE: ICD-10-CM

## 2023-03-02 PROCEDURE — 20610 DRAIN/INJ JOINT/BURSA W/O US: CPT | Performed by: INTERNAL MEDICINE

## 2023-03-02 PROCEDURE — 99397 PER PM REEVAL EST PAT 65+ YR: CPT | Performed by: INTERNAL MEDICINE

## 2023-03-02 PROCEDURE — S0020 INJECTION, BUPIVICAINE HYDRO: HCPCS | Performed by: INTERNAL MEDICINE

## 2023-03-02 PROCEDURE — 3078F DIAST BP <80 MM HG: CPT | Performed by: INTERNAL MEDICINE

## 2023-03-02 PROCEDURE — 90471 IMMUNIZATION ADMIN: CPT | Performed by: INTERNAL MEDICINE

## 2023-03-02 PROCEDURE — 3075F SYST BP GE 130 - 139MM HG: CPT | Performed by: INTERNAL MEDICINE

## 2023-03-02 PROCEDURE — 96372 THER/PROPH/DIAG INJ SC/IM: CPT | Performed by: INTERNAL MEDICINE

## 2023-03-02 PROCEDURE — 90677 PCV20 VACCINE IM: CPT | Performed by: INTERNAL MEDICINE

## 2023-03-02 PROCEDURE — 99214 OFFICE O/P EST MOD 30 MIN: CPT | Performed by: INTERNAL MEDICINE

## 2023-03-02 PROCEDURE — 3008F BODY MASS INDEX DOCD: CPT | Performed by: INTERNAL MEDICINE

## 2023-03-02 RX ORDER — TRIAMCINOLONE ACETONIDE 40 MG/ML
40 INJECTION, SUSPENSION INTRA-ARTICULAR; INTRAMUSCULAR ONCE
Status: COMPLETED | OUTPATIENT
Start: 2023-03-02 | End: 2023-03-02

## 2023-03-02 RX ORDER — BUPIVACAINE HYDROCHLORIDE 5 MG/ML
2 INJECTION, SOLUTION EPIDURAL; INTRACAUDAL ONCE
Status: COMPLETED | OUTPATIENT
Start: 2023-03-02 | End: 2023-03-02

## 2023-03-02 RX ORDER — LIDOCAINE HYDROCHLORIDE 10 MG/ML
2 INJECTION, SOLUTION INFILTRATION; PERINEURAL ONCE
Status: COMPLETED | OUTPATIENT
Start: 2023-03-02 | End: 2023-03-02

## 2023-03-02 RX ORDER — LIDOCAINE HYDROCHLORIDE 10 MG/ML
5 INJECTION, SOLUTION INFILTRATION; PERINEURAL ONCE
Status: COMPLETED | OUTPATIENT
Start: 2023-03-02 | End: 2023-03-02

## 2023-03-02 RX ADMIN — LIDOCAINE HYDROCHLORIDE 2 ML: 10 INJECTION, SOLUTION INFILTRATION; PERINEURAL at 16:46:00

## 2023-03-02 RX ADMIN — BUPIVACAINE HYDROCHLORIDE 2 ML: 5 INJECTION, SOLUTION EPIDURAL; INTRACAUDAL at 16:39:00

## 2023-03-02 RX ADMIN — TRIAMCINOLONE ACETONIDE 40 MG: 40 INJECTION, SUSPENSION INTRA-ARTICULAR; INTRAMUSCULAR at 16:48:00

## 2023-03-02 RX ADMIN — BUPIVACAINE HYDROCHLORIDE 2 ML: 5 INJECTION, SOLUTION EPIDURAL; INTRACAUDAL at 16:42:00

## 2023-03-02 RX ADMIN — TRIAMCINOLONE ACETONIDE 40 MG: 40 INJECTION, SUSPENSION INTRA-ARTICULAR; INTRAMUSCULAR at 16:47:00

## 2023-03-02 RX ADMIN — LIDOCAINE HYDROCHLORIDE 5 ML: 10 INJECTION, SOLUTION INFILTRATION; PERINEURAL at 16:45:00

## 2023-03-04 ENCOUNTER — LAB ENCOUNTER (OUTPATIENT)
Dept: LAB | Age: 78
End: 2023-03-04
Attending: INTERNAL MEDICINE
Payer: MEDICAID

## 2023-03-04 DIAGNOSIS — Z13.21 ENCOUNTER FOR VITAMIN DEFICIENCY SCREENING: ICD-10-CM

## 2023-03-04 DIAGNOSIS — Z00.00 WELLNESS EXAMINATION: ICD-10-CM

## 2023-03-04 DIAGNOSIS — Z13.0 SCREENING FOR DEFICIENCY ANEMIA: ICD-10-CM

## 2023-03-04 DIAGNOSIS — R73.09 ELEVATED GLUCOSE: ICD-10-CM

## 2023-03-04 DIAGNOSIS — Z13.29 THYROID DISORDER SCREEN: ICD-10-CM

## 2023-03-04 DIAGNOSIS — Z85.038 HISTORY OF COLON CANCER: ICD-10-CM

## 2023-03-04 DIAGNOSIS — R53.83 FATIGUE, UNSPECIFIED TYPE: ICD-10-CM

## 2023-03-04 DIAGNOSIS — Z13.220 LIPID SCREENING: ICD-10-CM

## 2023-03-04 LAB
ALBUMIN SERPL-MCNC: 3.7 G/DL (ref 3.4–5)
ALBUMIN/GLOB SERPL: 1 {RATIO} (ref 1–2)
ALP LIVER SERPL-CCNC: 89 U/L
ALT SERPL-CCNC: 18 U/L
ANION GAP SERPL CALC-SCNC: 4 MMOL/L (ref 0–18)
AST SERPL-CCNC: 10 U/L (ref 15–37)
BASOPHILS # BLD AUTO: 0.04 X10(3) UL (ref 0–0.2)
BASOPHILS NFR BLD AUTO: 0.3 %
BILIRUB SERPL-MCNC: 0.4 MG/DL (ref 0.1–2)
BUN BLD-MCNC: 26 MG/DL (ref 7–18)
BUN/CREAT SERPL: 27.4 (ref 10–20)
CALCIUM BLD-MCNC: 9.6 MG/DL (ref 8.5–10.1)
CANCER AG19-9 SERPL-ACNC: 5.3 U/ML (ref ?–37)
CEA SERPL-MCNC: 0.6 NG/ML (ref ?–5)
CHLORIDE SERPL-SCNC: 108 MMOL/L (ref 98–112)
CHOLEST SERPL-MCNC: 214 MG/DL (ref ?–200)
CO2 SERPL-SCNC: 30 MMOL/L (ref 21–32)
CREAT BLD-MCNC: 0.95 MG/DL
DEPRECATED RDW RBC AUTO: 41.5 FL (ref 35.1–46.3)
EOSINOPHIL # BLD AUTO: 0.01 X10(3) UL (ref 0–0.7)
EOSINOPHIL NFR BLD AUTO: 0.1 %
ERYTHROCYTE [DISTWIDTH] IN BLOOD BY AUTOMATED COUNT: 12.5 % (ref 11–15)
EST. AVERAGE GLUCOSE BLD GHB EST-MCNC: 120 MG/DL (ref 68–126)
FASTING PATIENT LIPID ANSWER: NO
FASTING STATUS PATIENT QL REPORTED: NO
FOLATE SERPL-MCNC: 14.4 NG/ML (ref 8.7–?)
GFR SERPLBLD BASED ON 1.73 SQ M-ARVRAT: 61 ML/MIN/1.73M2 (ref 60–?)
GLOBULIN PLAS-MCNC: 3.6 G/DL (ref 2.8–4.4)
GLUCOSE BLD-MCNC: 172 MG/DL (ref 70–99)
HBA1C MFR BLD: 5.8 % (ref ?–5.7)
HCT VFR BLD AUTO: 45 %
HDLC SERPL-MCNC: 70 MG/DL (ref 40–59)
HGB BLD-MCNC: 14.8 G/DL
IMM GRANULOCYTES # BLD AUTO: 0.11 X10(3) UL (ref 0–1)
IMM GRANULOCYTES NFR BLD: 0.8 %
LDLC SERPL CALC-MCNC: 124 MG/DL (ref ?–100)
LYMPHOCYTES # BLD AUTO: 1.22 X10(3) UL (ref 1–4)
LYMPHOCYTES NFR BLD AUTO: 8.4 %
MCH RBC QN AUTO: 30.1 PG (ref 26–34)
MCHC RBC AUTO-ENTMCNC: 32.9 G/DL (ref 31–37)
MCV RBC AUTO: 91.5 FL
MONOCYTES # BLD AUTO: 0.56 X10(3) UL (ref 0.1–1)
MONOCYTES NFR BLD AUTO: 3.9 %
NEUTROPHILS # BLD AUTO: 12.5 X10 (3) UL (ref 1.5–7.7)
NEUTROPHILS # BLD AUTO: 12.5 X10(3) UL (ref 1.5–7.7)
NEUTROPHILS NFR BLD AUTO: 86.5 %
NONHDLC SERPL-MCNC: 144 MG/DL (ref ?–130)
OSMOLALITY SERPL CALC.SUM OF ELEC: 303 MOSM/KG (ref 275–295)
PLATELET # BLD AUTO: 272 10(3)UL (ref 150–450)
POTASSIUM SERPL-SCNC: 4.5 MMOL/L (ref 3.5–5.1)
PROT SERPL-MCNC: 7.3 G/DL (ref 6.4–8.2)
RBC # BLD AUTO: 4.92 X10(6)UL
SODIUM SERPL-SCNC: 142 MMOL/L (ref 136–145)
TRIGL SERPL-MCNC: 114 MG/DL (ref 30–149)
TSI SER-ACNC: 0.58 MIU/ML (ref 0.36–3.74)
VIT B12 SERPL-MCNC: 389 PG/ML (ref 193–986)
VIT D+METAB SERPL-MCNC: 26.3 NG/ML (ref 30–100)
VLDLC SERPL CALC-MCNC: 20 MG/DL (ref 0–30)
WBC # BLD AUTO: 14.4 X10(3) UL (ref 4–11)

## 2023-03-04 PROCEDURE — 80053 COMPREHEN METABOLIC PANEL: CPT

## 2023-03-04 PROCEDURE — 82746 ASSAY OF FOLIC ACID SERUM: CPT

## 2023-03-04 PROCEDURE — 82378 CARCINOEMBRYONIC ANTIGEN: CPT

## 2023-03-04 PROCEDURE — 82607 VITAMIN B-12: CPT

## 2023-03-04 PROCEDURE — 82306 VITAMIN D 25 HYDROXY: CPT

## 2023-03-04 PROCEDURE — 80061 LIPID PANEL: CPT

## 2023-03-04 PROCEDURE — 83036 HEMOGLOBIN GLYCOSYLATED A1C: CPT

## 2023-03-04 PROCEDURE — 86301 IMMUNOASSAY TUMOR CA 19-9: CPT

## 2023-03-04 PROCEDURE — 36415 COLL VENOUS BLD VENIPUNCTURE: CPT

## 2023-03-04 PROCEDURE — 85025 COMPLETE CBC W/AUTO DIFF WBC: CPT

## 2023-03-04 PROCEDURE — 84443 ASSAY THYROID STIM HORMONE: CPT

## 2023-03-19 DIAGNOSIS — K21.9 GASTROESOPHAGEAL REFLUX DISEASE WITHOUT ESOPHAGITIS: ICD-10-CM

## 2023-03-20 RX ORDER — PANTOPRAZOLE SODIUM 40 MG/1
TABLET, DELAYED RELEASE ORAL
Qty: 90 TABLET | Refills: 3 | Status: SHIPPED | OUTPATIENT
Start: 2023-03-20

## 2023-03-31 ENCOUNTER — HOSPITAL ENCOUNTER (OUTPATIENT)
Dept: BONE DENSITY | Facility: HOSPITAL | Age: 78
Discharge: HOME | End: 2023-03-31
Attending: INTERNAL MEDICINE
Payer: MEDICAID

## 2023-03-31 DIAGNOSIS — M81.0 AGE-RELATED OSTEOPOROSIS WITHOUT CURRENT PATHOLOGICAL FRACTURE: Primary | ICD-10-CM

## 2023-03-31 DIAGNOSIS — Z13.820 OSTEOPOROSIS SCREENING: ICD-10-CM

## 2023-03-31 PROCEDURE — 77080 DXA BONE DENSITY AXIAL: CPT | Performed by: INTERNAL MEDICINE

## 2023-03-31 RX ORDER — ALENDRONATE SODIUM 70 MG/1
70 TABLET ORAL
Qty: 12 TABLET | Refills: 20 | Status: SHIPPED | OUTPATIENT
Start: 2023-03-31 | End: 2028-03-31

## 2023-04-20 ENCOUNTER — HOSPITAL ENCOUNTER (OUTPATIENT)
Dept: ULTRASOUND IMAGING | Facility: HOSPITAL | Age: 78
Discharge: HOME | End: 2023-04-20
Attending: INTERNAL MEDICINE
Payer: MEDICAID

## 2023-04-20 ENCOUNTER — TELEPHONE (OUTPATIENT)
Dept: INTERNAL MEDICINE CLINIC | Facility: CLINIC | Age: 78
End: 2023-04-20

## 2023-04-20 DIAGNOSIS — R10.13 EPIGASTRIC PAIN: ICD-10-CM

## 2023-04-20 NOTE — TELEPHONE ENCOUNTER
Attempted to contact [id number] team for correct order, no response  Ordering User Davion Sheriff have you been contacted regarding this order?

## 2023-04-20 NOTE — TELEPHONE ENCOUNTER
Daughter requesting to speak with RN. States patient went to get her US today and nothing was done. patient was told they have the wrong order but no one has called to advise what is wrong with order or what to do. Please advise.

## 2023-04-21 NOTE — TELEPHONE ENCOUNTER
Daughter picked up and hung up call, radiologist requested new order & Dr Khari Encarnacion placed correct order.      Pt should now be able to schedule

## 2023-04-25 ENCOUNTER — HOSPITAL ENCOUNTER (OUTPATIENT)
Dept: ULTRASOUND IMAGING | Facility: HOSPITAL | Age: 78
Discharge: HOME | End: 2023-04-25
Attending: INTERNAL MEDICINE
Payer: MEDICAID

## 2023-04-25 DIAGNOSIS — K80.20 GALLSTONES: Primary | ICD-10-CM

## 2023-04-25 PROCEDURE — 76700 US EXAM ABDOM COMPLETE: CPT | Performed by: INTERNAL MEDICINE

## 2024-03-14 ENCOUNTER — LAB ENCOUNTER (OUTPATIENT)
Dept: LAB | Age: 79
End: 2024-03-14
Attending: INTERNAL MEDICINE
Payer: MEDICAID

## 2024-03-14 ENCOUNTER — OFFICE VISIT (OUTPATIENT)
Dept: INTERNAL MEDICINE CLINIC | Facility: CLINIC | Age: 79
End: 2024-03-14

## 2024-03-14 VITALS
BODY MASS INDEX: 32.94 KG/M2 | WEIGHT: 179 LBS | HEIGHT: 62 IN | HEART RATE: 78 BPM | DIASTOLIC BLOOD PRESSURE: 75 MMHG | SYSTOLIC BLOOD PRESSURE: 124 MMHG | OXYGEN SATURATION: 98 %

## 2024-03-14 DIAGNOSIS — M17.0 BILATERAL PRIMARY OSTEOARTHRITIS OF KNEE: ICD-10-CM

## 2024-03-14 DIAGNOSIS — Z00.00 WELLNESS EXAMINATION: ICD-10-CM

## 2024-03-14 DIAGNOSIS — Z13.0 SCREENING FOR DEFICIENCY ANEMIA: ICD-10-CM

## 2024-03-14 DIAGNOSIS — Z13.220 LIPID SCREENING: ICD-10-CM

## 2024-03-14 DIAGNOSIS — R53.83 OTHER FATIGUE: ICD-10-CM

## 2024-03-14 DIAGNOSIS — R73.09 ELEVATED GLUCOSE: ICD-10-CM

## 2024-03-14 DIAGNOSIS — Z12.31 ENCOUNTER FOR SCREENING MAMMOGRAM FOR MALIGNANT NEOPLASM OF BREAST: ICD-10-CM

## 2024-03-14 DIAGNOSIS — M81.0 AGE-RELATED OSTEOPOROSIS WITHOUT CURRENT PATHOLOGICAL FRACTURE: ICD-10-CM

## 2024-03-14 DIAGNOSIS — Z85.038 HISTORY OF COLON CANCER: ICD-10-CM

## 2024-03-14 DIAGNOSIS — Z00.00 WELLNESS EXAMINATION: Primary | ICD-10-CM

## 2024-03-14 DIAGNOSIS — Z13.29 THYROID DISORDER SCREEN: ICD-10-CM

## 2024-03-14 DIAGNOSIS — K21.9 GASTROESOPHAGEAL REFLUX DISEASE, UNSPECIFIED WHETHER ESOPHAGITIS PRESENT: ICD-10-CM

## 2024-03-14 DIAGNOSIS — Z13.21 ENCOUNTER FOR VITAMIN DEFICIENCY SCREENING: ICD-10-CM

## 2024-03-14 LAB
ALBUMIN SERPL-MCNC: 4.4 G/DL (ref 3.2–4.8)
ALBUMIN/GLOB SERPL: 1.6 {RATIO} (ref 1–2)
ALP LIVER SERPL-CCNC: 85 U/L
ALT SERPL-CCNC: 16 U/L
ANION GAP SERPL CALC-SCNC: 6 MMOL/L (ref 0–18)
AST SERPL-CCNC: 21 U/L (ref ?–34)
BASOPHILS # BLD AUTO: 0.06 X10(3) UL (ref 0–0.2)
BASOPHILS NFR BLD AUTO: 0.9 %
BILIRUB SERPL-MCNC: 0.6 MG/DL (ref 0.2–1.1)
BUN BLD-MCNC: 15 MG/DL (ref 9–23)
BUN/CREAT SERPL: 17.9 (ref 10–20)
CALCIUM BLD-MCNC: 10.3 MG/DL (ref 8.7–10.4)
CANCER AG19-9 SERPL-ACNC: 16.7 U/ML (ref ?–35)
CEA SERPL-MCNC: <0.5 NG/ML (ref ?–5)
CHLORIDE SERPL-SCNC: 104 MMOL/L (ref 98–112)
CHOLEST SERPL-MCNC: 227 MG/DL (ref ?–200)
CO2 SERPL-SCNC: 31 MMOL/L (ref 21–32)
CREAT BLD-MCNC: 0.84 MG/DL
DEPRECATED RDW RBC AUTO: 40.2 FL (ref 35.1–46.3)
EGFRCR SERPLBLD CKD-EPI 2021: 71 ML/MIN/1.73M2 (ref 60–?)
EOSINOPHIL # BLD AUTO: 0.13 X10(3) UL (ref 0–0.7)
EOSINOPHIL NFR BLD AUTO: 1.9 %
ERYTHROCYTE [DISTWIDTH] IN BLOOD BY AUTOMATED COUNT: 12.2 % (ref 11–15)
EST. AVERAGE GLUCOSE BLD GHB EST-MCNC: 120 MG/DL (ref 68–126)
FASTING PATIENT LIPID ANSWER: YES
FASTING STATUS PATIENT QL REPORTED: YES
GLOBULIN PLAS-MCNC: 2.8 G/DL (ref 2.8–4.4)
GLUCOSE BLD-MCNC: 111 MG/DL (ref 70–99)
HBA1C MFR BLD: 5.8 % (ref ?–5.7)
HCT VFR BLD AUTO: 45.6 %
HDLC SERPL-MCNC: 60 MG/DL (ref 40–59)
HGB BLD-MCNC: 15.3 G/DL
IMM GRANULOCYTES # BLD AUTO: 0.01 X10(3) UL (ref 0–1)
IMM GRANULOCYTES NFR BLD: 0.1 %
LDLC SERPL CALC-MCNC: 146 MG/DL (ref ?–100)
LYMPHOCYTES # BLD AUTO: 1.24 X10(3) UL (ref 1–4)
LYMPHOCYTES NFR BLD AUTO: 18.1 %
MCH RBC QN AUTO: 30.1 PG (ref 26–34)
MCHC RBC AUTO-ENTMCNC: 33.6 G/DL (ref 31–37)
MCV RBC AUTO: 89.8 FL
MONOCYTES # BLD AUTO: 0.66 X10(3) UL (ref 0.1–1)
MONOCYTES NFR BLD AUTO: 9.6 %
NEUTROPHILS # BLD AUTO: 4.76 X10 (3) UL (ref 1.5–7.7)
NEUTROPHILS # BLD AUTO: 4.76 X10(3) UL (ref 1.5–7.7)
NEUTROPHILS NFR BLD AUTO: 69.4 %
NONHDLC SERPL-MCNC: 167 MG/DL (ref ?–130)
OSMOLALITY SERPL CALC.SUM OF ELEC: 294 MOSM/KG (ref 275–295)
PLATELET # BLD AUTO: 249 10(3)UL (ref 150–450)
POTASSIUM SERPL-SCNC: 4.9 MMOL/L (ref 3.5–5.1)
PROT SERPL-MCNC: 7.2 G/DL (ref 5.7–8.2)
RBC # BLD AUTO: 5.08 X10(6)UL
SODIUM SERPL-SCNC: 141 MMOL/L (ref 136–145)
TRIGL SERPL-MCNC: 116 MG/DL (ref 30–149)
TSI SER-ACNC: 1.06 MIU/ML (ref 0.55–4.78)
VIT B12 SERPL-MCNC: 850 PG/ML (ref 211–911)
VIT D+METAB SERPL-MCNC: 50 NG/ML (ref 30–100)
VLDLC SERPL CALC-MCNC: 22 MG/DL (ref 0–30)
WBC # BLD AUTO: 6.9 X10(3) UL (ref 4–11)

## 2024-03-14 PROCEDURE — 99397 PER PM REEVAL EST PAT 65+ YR: CPT | Performed by: INTERNAL MEDICINE

## 2024-03-14 PROCEDURE — 85025 COMPLETE CBC W/AUTO DIFF WBC: CPT

## 2024-03-14 PROCEDURE — 80053 COMPREHEN METABOLIC PANEL: CPT

## 2024-03-14 PROCEDURE — 99214 OFFICE O/P EST MOD 30 MIN: CPT | Performed by: INTERNAL MEDICINE

## 2024-03-14 PROCEDURE — 86301 IMMUNOASSAY TUMOR CA 19-9: CPT

## 2024-03-14 PROCEDURE — 82607 VITAMIN B-12: CPT

## 2024-03-14 PROCEDURE — 82378 CARCINOEMBRYONIC ANTIGEN: CPT

## 2024-03-14 PROCEDURE — 84443 ASSAY THYROID STIM HORMONE: CPT

## 2024-03-14 PROCEDURE — 36415 COLL VENOUS BLD VENIPUNCTURE: CPT

## 2024-03-14 PROCEDURE — 80061 LIPID PANEL: CPT

## 2024-03-14 PROCEDURE — 83036 HEMOGLOBIN GLYCOSYLATED A1C: CPT

## 2024-03-14 PROCEDURE — 82306 VITAMIN D 25 HYDROXY: CPT

## 2024-03-14 RX ORDER — MELOXICAM 15 MG/1
15 TABLET ORAL DAILY PRN
Qty: 60 TABLET | Refills: 0 | Status: SHIPPED | OUTPATIENT
Start: 2024-03-14

## 2024-03-14 RX ORDER — HYDROCHLOROTHIAZIDE 12.5 MG/1
12.5 CAPSULE, GELATIN COATED ORAL DAILY PRN
Qty: 90 CAPSULE | Refills: 3 | Status: SHIPPED | OUTPATIENT
Start: 2024-03-14

## 2024-03-14 RX ORDER — ALENDRONATE SODIUM 70 MG/1
70 TABLET ORAL
Qty: 12 TABLET | Refills: 3 | Status: SHIPPED | OUTPATIENT
Start: 2024-03-14 | End: 2029-03-15

## 2024-03-14 RX ORDER — PANTOPRAZOLE SODIUM 40 MG/1
40 TABLET, DELAYED RELEASE ORAL
Qty: 90 TABLET | Refills: 0 | Status: SHIPPED | OUTPATIENT
Start: 2024-03-14

## 2024-03-14 NOTE — PROGRESS NOTES
Outpatient Office Note    HPI:     Cynthia Mata is a 78 year old female.  Chief Complaint   Patient presents with    Physical         Very pleasant Pakistani- speaking lady with past medical history of colon cancer status postresection and radiotherapy in [place] 2018, GERD, HTN, anxiety who is here for annual physical     Patient was diagnosed with colon cancer in 2018 and underwent treatment with surgery and radiotherapy in [facility].  Per daughter, patient underwent follow-up and was told she was in remission.  She needs her markers drawn yearly.  Daughter also reports that patient's surgery was complicated by peritonitis and had to undergo further surgeries.    Patient also has constant bilateral knee pain that is worse on the left. S/p bilateral knee steroid injections last year without much improvemenet.  Patient is traveling to [facility] and staying there for 6 months.  She is planning on getting physical therapy there and hopefully will follow-up with orthopedic surgery when she comes back.      Has occasional GERD that is relieved by pantoprazole      Current Outpatient Medications   Medication Sig Dispense Refill    hydroCHLOROthiazide 12.5 MG Oral Cap Take 1 capsule (12.5 mg total) by mouth daily as needed (leg swelling). 90 capsule 3    pantoprazole 40 MG Oral Tab EC Take 1 tablet (40 mg total) by mouth every morning before breakfast. 90 tablet 0    Meloxicam 15 MG Oral Tab Take 1 tablet (15 mg total) by mouth daily as needed for Pain. 60 tablet 0    alendronate 70 MG Oral Tab Take 1 tablet (70 mg total) by mouth every 7 days. Take alone on an empty stomach first thing in the morning with at least 240 mL (8 oz) of water. After administration, do not have food, drink, medications, or supplements for at least one half-hour 12 tablet 3    PANTOPRAZOLE 40 MG Oral Tab EC Take 1 tablet by mouth every morning before breakfast. 90 tablet 3    PAROXETINE HCL 10 MG Oral Tab TAKE 1 TABLET BY MOUTH EVERY DAY IN THE  MORNING 30 tablet 0    Montelukast Sodium 10 MG Oral Tab Take 1 tablet (10 mg total) by mouth nightly. 30 tablet 3    ergocalciferol 75058 units Oral Cap   2    AmLODIPine Besylate 10 MG Oral Tab   2    Meclizine HCl 25 MG Oral Tab Take 1 tablet (25 mg total) by mouth 3 (three) times daily as needed. 20 tablet 0    ketoconazole (NIZORAL) 2 % Apply Externally Cream Apply 1 Application topically 2 (two) times daily. 45 g 0    TraMADol HCl 50 MG Oral Tab  (Patient not taking: Reported on 2/21/2023)  1    Fluticasone Propionate 50 MCG/ACT Nasal Suspension 2 sprays by Nasal route daily. (Patient not taking: Reported on 3/14/2024) 1 Bottle 3    PEG 3350-KCl-NaBcb-NaCl-NaSulf (COLYTE WITH FLAVOR PACKS) 240 g Oral Recon Soln As directed per GI consult notes (Patient not taking: Reported on 2/21/2023) 1 Bottle 0      Past Medical History:   Diagnosis Date    Acid reflux     Anxiety 2/21/2023    Arthritis     Cataract     Esophageal reflux     Essential hypertension     High blood pressure     History of colon cancer 2/21/2023    Diagnosed and treated in [facility] with surgery and radiotherapy    Panic attack     Primary osteoarthritis of both knees 2/21/2023    Visual impairment       Past Surgical History:   Procedure Laterality Date    CATARACT      EGD  2014    in [facility]      Social History:  Social History     Socioeconomic History    Marital status:    Tobacco Use    Smoking status: Never    Smokeless tobacco: Never   Substance and Sexual Activity    Alcohol use: No    Drug use: No      History reviewed. No pertinent family history.   No Known Allergies     REVIEW OF SYSTEMS:   Review of Systems   Review of Systems   Constitutional: Negative for activity change, appetite change and fever.   HENT: Negative for congestion and voice change.    Respiratory: Negative for cough and shortness of breath.    Cardiovascular: Negative for chest pain.   Gastrointestinal: Negative for abdominal distention, abdominal pain and  vomiting.   Genitourinary: Negative for hematuria.   Skin: Negative for wound.   Psychiatric/Behavioral: Negative for behavioral problems.   Wt Readings from Last 5 Encounters:   03/14/24 179 lb (81.2 kg)   03/02/23 177 lb (80.3 kg)   02/21/23 176 lb (79.8 kg)   03/16/18 147 lb (66.7 kg)   03/12/18 147 lb (66.7 kg)     Body mass index is 32.74 kg/m².      EXAM:   /75   Pulse 78   Ht 5' 2\" (1.575 m)   Wt 179 lb (81.2 kg)   SpO2 98%   BMI 32.74 kg/m²   Physical Exam   Constitutional:       Appearance: Normal appearance.   HENT:      Head: Normocephalic.   Eyes:      Conjunctiva/sclera: Conjunctivae normal.   Cardiovascular:      Rate and Rhythm: Normal rate and regular rhythm.      Heart sounds: Normal heart sounds. No murmur heard.  Pulmonary:      Effort: Pulmonary effort is normal.      Breath sounds: Normal breath sounds. No rhonchi or rales.   Abdominal:      General: Bowel sounds are normal.      Palpations: Abdomen is soft.      Tenderness: There is no abdominal tenderness.   Musculoskeletal:      Cervical back: Neck supple.      Right lower leg: No edema.      Left lower leg: No edema.   Skin:     General: Skin is warm and dry.   Neurological:      General: No focal deficit present.      Mental Status: He is alert and oriented to person, place, and time. Mental status is at baseline.   Psychiatric:         Mood and Affect: Mood normal.         Behavior: Behavior normal.       Health Maintenence:     Health Maintenance Topics with due status: Overdue       Topic Date Due    Zoster Vaccines Never done    COVID-19 Vaccine Never done    Influenza Vaccine 10/01/2023    Annual Depression Screening 01/01/2024    Fall Risk Screening (Annual) 01/01/2024    Annual Physical 03/02/2024            ASSESSMENT AND PLAN:   1. Encounter for screening mammogram for malignant neoplasm of breast  - [facility] VENITA 2D+3D SCREENING BILAT (CPT=77067/44273); Future    2. History of colon cancer  - CEA [E]; Future  - Carbohydrate  Antigen 19-9 [E]; Future    3. Elevated glucose  - Hemoglobin A1C; Future    4. Wellness examination  - Comp Metabolic Panel (14); Future  - CBC With Differential With Platelet; Future  - Hemoglobin A1C; Future  - Lipid Panel; Future  - TSH W Reflex To Free T4; Future  - Vitamin D, 25-Hydroxy; Future    5. Encounter for vitamin deficiency screening  - Vitamin D, 25-Hydroxy; Future    6. Lipid screening  - Lipid Panel; Future    7. Screening for deficiency anemia  - CBC With Differential With Platelet; Future    8. Thyroid disorder screen  - TSH W Reflex To Free T4; Future    9. Gastroesophageal reflux disease, unspecified whether esophagitis present  - pantoprazole 40 MG Oral Tab EC; Take 1 tablet (40 mg total) by mouth every morning before breakfast.  Dispense: 90 tablet; Refill: 0    10. Bilateral primary osteoarthritis of knee  - Meloxicam 15 MG Oral Tab; Take 1 tablet (15 mg total) by mouth daily as needed for Pain.  Dispense: 60 tablet; Refill: 0    11. Other fatigue  - Vitamin B12 [E]; Future    12. Age-related osteoporosis without current pathological fracture  - alendronate 70 MG Oral Tab; Take 1 tablet (70 mg total) by mouth every 7 days. Take alone on an empty stomach first thing in the morning with at least 240 mL (8 oz) of water. After administration, do not have food, drink, medications, or supplements for at least one half-hour  Dispense: 12 tablet; Refill: 3          The patient indicates understanding of these issues and agrees to the plan.  No follow-ups on file.        This note was prepared using Dragon Medical voice recognition dictation software. As a result errors may occur. When identified these errors have been corrected. While every attempt is made to correct errors during dictation discrepancies may still exist.    David Richardson MD

## 2024-04-27 ENCOUNTER — HOSPITAL ENCOUNTER (OUTPATIENT)
Dept: MAMMOGRAPHY | Age: 79
Discharge: HOME | End: 2024-04-27
Attending: INTERNAL MEDICINE
Payer: MEDICAID

## 2024-04-27 DIAGNOSIS — Z12.31 ENCOUNTER FOR SCREENING MAMMOGRAM FOR MALIGNANT NEOPLASM OF BREAST: ICD-10-CM

## 2024-04-27 PROCEDURE — 77067 SCR MAMMO BI INCL CAD: CPT | Performed by: INTERNAL MEDICINE

## 2024-04-27 PROCEDURE — 77063 BREAST TOMOSYNTHESIS BI: CPT | Performed by: INTERNAL MEDICINE

## 2025-01-17 ENCOUNTER — OFFICE VISIT (OUTPATIENT)
Dept: INTERNAL MEDICINE CLINIC | Facility: CLINIC | Age: 80
End: 2025-01-17

## 2025-01-17 VITALS
RESPIRATION RATE: 17 BRPM | WEIGHT: 179 LBS | TEMPERATURE: 98 F | DIASTOLIC BLOOD PRESSURE: 70 MMHG | HEART RATE: 64 BPM | OXYGEN SATURATION: 96 % | BODY MASS INDEX: 32.94 KG/M2 | SYSTOLIC BLOOD PRESSURE: 126 MMHG | HEIGHT: 62 IN

## 2025-01-17 DIAGNOSIS — G89.29 CHRONIC PAIN OF BOTH KNEES: ICD-10-CM

## 2025-01-17 DIAGNOSIS — M17.0 PRIMARY OSTEOARTHRITIS OF BOTH KNEES: Primary | ICD-10-CM

## 2025-01-17 DIAGNOSIS — M25.562 CHRONIC PAIN OF BOTH KNEES: ICD-10-CM

## 2025-01-17 DIAGNOSIS — M25.561 CHRONIC PAIN OF BOTH KNEES: ICD-10-CM

## 2025-01-17 PROCEDURE — 99214 OFFICE O/P EST MOD 30 MIN: CPT | Performed by: INTERNAL MEDICINE

## 2025-01-17 RX ORDER — METHYLPREDNISOLONE 4 MG/1
TABLET ORAL
Qty: 1 EACH | Refills: 0 | Status: SHIPPED | OUTPATIENT
Start: 2025-01-17

## 2025-01-22 NOTE — PROGRESS NOTES
Subjective:     Patient ID: Cynthia Mata is a 79 year old female.    Pt comes in to Establish Care  Pt is complaining today with bl Knee Pain (for a few wks, every day.  No injury           History/Other:   Review of Systems   Constitutional: Negative.    HENT: Negative.     Eyes: Negative.    Respiratory: Negative.     Cardiovascular: Negative.    Gastrointestinal: Negative.    Genitourinary: Negative.    Musculoskeletal:  Positive for arthralgias.        Bl knee pain    Skin: Negative.    Neurological: Negative.    Psychiatric/Behavioral: Negative.       Current Outpatient Medications   Medication Sig Dispense Refill    methylPREDNISolone (MEDROL) 4 MG Oral Tablet Therapy Pack As directed. 1 each 0    hydroCHLOROthiazide 12.5 MG Oral Cap Take 1 capsule (12.5 mg total) by mouth daily as needed (leg swelling). 90 capsule 3    pantoprazole 40 MG Oral Tab EC Take 1 tablet (40 mg total) by mouth every morning before breakfast. 90 tablet 0    Meloxicam 15 MG Oral Tab Take 1 tablet (15 mg total) by mouth daily as needed for Pain. 60 tablet 0    alendronate 70 MG Oral Tab Take 1 tablet (70 mg total) by mouth every 7 days. Take alone on an empty stomach first thing in the morning with at least 240 mL (8 oz) of water. After administration, do not have food, drink, medications, or supplements for at least one half-hour 12 tablet 3    PANTOPRAZOLE 40 MG Oral Tab EC Take 1 tablet by mouth every morning before breakfast. 90 tablet 3    PAROXETINE HCL 10 MG Oral Tab TAKE 1 TABLET BY MOUTH EVERY DAY IN THE MORNING 30 tablet 0    Montelukast Sodium 10 MG Oral Tab Take 1 tablet (10 mg total) by mouth nightly. 30 tablet 3    ergocalciferol 58365 units Oral Cap   2    AmLODIPine Besylate 10 MG Oral Tab   2    Meclizine HCl 25 MG Oral Tab Take 1 tablet (25 mg total) by mouth 3 (three) times daily as needed. 20 tablet 0    ketoconazole (NIZORAL) 2 % Apply Externally Cream Apply 1 Application topically 2 (two) times daily. 45 g 0      Allergies:Allergies[1]    Past Medical History:    Acid reflux    Anxiety    Arthritis    Cancer (HCC)    Cataract    Esophageal reflux    Essential hypertension    High blood pressure    History of colon cancer    Diagnosed and treated in [facility] with surgery and radiotherapy    Panic attack    Primary osteoarthritis of both knees    Visual impairment      Past Surgical History:   Procedure Laterality Date    Cataract      Egd  2014    in [facility]      Family History   Problem Relation Age of Onset    Colon Cancer Self       Social History:   Social History     Socioeconomic History    Marital status:    Tobacco Use    Smoking status: Never    Smokeless tobacco: Never   Substance and Sexual Activity    Alcohol use: No    Drug use: No        Objective:   Physical Exam  Vitals and nursing note reviewed.   Constitutional:       Appearance: She is well-developed.   HENT:      Head: Normocephalic and atraumatic.      Right Ear: External ear normal.      Left Ear: External ear normal.      Nose: Nose normal.   Eyes:      Conjunctiva/sclera: Conjunctivae normal.      Pupils: Pupils are equal, round, and reactive to light.   Cardiovascular:      Rate and Rhythm: Normal rate and regular rhythm.      Heart sounds: Normal heart sounds.   Pulmonary:      Effort: Pulmonary effort is normal.      Breath sounds: Normal breath sounds.   Abdominal:      General: Bowel sounds are normal.      Palpations: Abdomen is soft.   Genitourinary:     Vagina: Normal.   Musculoskeletal:         General: Tenderness present. No swelling. Normal range of motion.      Cervical back: Normal range of motion and neck supple.      Comments: Bl knee pain    Skin:     General: Skin is warm and dry.   Neurological:      Mental Status: She is alert and oriented to person, place, and time.      Deep Tendon Reflexes: Reflexes are normal and symmetric.   Psychiatric:         Behavior: Behavior normal.         Thought Content: Thought content  normal.         Judgment: Judgment normal.         Assessment & Plan:   1. Primary osteoarthritis of both knees - will order xr and will ref to physiatry   2. Chronic pain of both knees -  also treat with steroids let us kow if not better        No orders of the defined types were placed in this encounter.      Meds This Visit:  Requested Prescriptions     Signed Prescriptions Disp Refills    methylPREDNISolone (MEDROL) 4 MG Oral Tablet Therapy Pack 1 each 0     Sig: As directed.       Imaging & Referrals:  PHYSIATRY - INTERNAL  XR KNEE (3 VIEWS) AP LAT OBL BILAT (DSA=[id number])            [1] No Known Allergies

## 2025-01-23 ENCOUNTER — HOSPITAL ENCOUNTER (OUTPATIENT)
Dept: GENERAL RADIOLOGY | Facility: HOSPITAL | Age: 80
Discharge: HOME | End: 2025-01-23
Attending: INTERNAL MEDICINE
Payer: MEDICAID

## 2025-01-23 DIAGNOSIS — M17.0 PRIMARY OSTEOARTHRITIS OF BOTH KNEES: ICD-10-CM

## 2025-01-23 PROCEDURE — 73562 X-RAY EXAM OF KNEE 3: CPT | Performed by: INTERNAL MEDICINE

## 2025-03-19 ENCOUNTER — OFFICE VISIT (OUTPATIENT)
Dept: PHYSICAL MEDICINE AND REHAB | Facility: CLINIC | Age: 80
End: 2025-03-19
Payer: MEDICAID

## 2025-03-19 VITALS — HEIGHT: 62 IN | BODY MASS INDEX: 32.94 KG/M2 | WEIGHT: 179 LBS

## 2025-03-19 DIAGNOSIS — I10 HYPERTENSION, UNSPECIFIED TYPE: ICD-10-CM

## 2025-03-19 DIAGNOSIS — K21.9 GASTROESOPHAGEAL REFLUX DISEASE WITHOUT ESOPHAGITIS: ICD-10-CM

## 2025-03-19 DIAGNOSIS — M25.562 CHRONIC PAIN OF BOTH KNEES: ICD-10-CM

## 2025-03-19 DIAGNOSIS — T39.395A NSAID INDUCED GASTRITIS: ICD-10-CM

## 2025-03-19 DIAGNOSIS — M25.561 CHRONIC PAIN OF BOTH KNEES: ICD-10-CM

## 2025-03-19 DIAGNOSIS — M17.10 PRIMARY OSTEOARTHRITIS OF KNEE, UNSPECIFIED LATERALITY: ICD-10-CM

## 2025-03-19 DIAGNOSIS — G89.29 CHRONIC PAIN OF BOTH KNEES: ICD-10-CM

## 2025-03-19 DIAGNOSIS — K29.60 NSAID INDUCED GASTRITIS: ICD-10-CM

## 2025-03-19 DIAGNOSIS — M22.2X9 PATELLOFEMORAL PAIN SYNDROME, UNSPECIFIED LATERALITY: Primary | ICD-10-CM

## 2025-03-19 PROCEDURE — 99204 OFFICE O/P NEW MOD 45 MIN: CPT | Performed by: PHYSICAL MEDICINE & REHABILITATION

## 2025-03-19 NOTE — PROGRESS NOTES
The following individual(s) verbally consented to be recorded using ambient AI listening technology and understand that they can each withdraw their consent to this listening technology at any point by asking the clinician to turn off or pause the recording:    Patient name: Cynthia Mata

## 2025-03-19 NOTE — PATIENT INSTRUCTIONS
1) Tylenol 500-1000 mg every 6-8 hours as needed for pain.  No more than 3000 mg daily.  2) Please begin physical therapy as soon as possible.   3) Take Glucosamine with chondroitin 1500/1200 mg daily.   4) Start tumeric with black pepper 1000 mg twice per day  5)  Ice 20 minutes at a time 3-4 times per day  6) My office will call you to schedule the BILATERAL knee HA and CSI uner US once the procedure is approved by your insurance carrier.

## 2025-03-19 NOTE — H&P
[facility]  Clinic H&P    Requesting Physician: Elian Hahn MD    CHIEF COMPLAINT:    Chief Complaint   Patient presents with    New Patient     New R handed pt presenting with bilateral knee pain. Pain is 7/10. Denies injury. Admits N/T and weakness. No PT or HEP. Taking tylenol PRN. Previous injections 3/2/23 and reports temporary relief. XR knee 1/23/25.        History of Present Illness  Cynthia Mata is an 80 year old female with knee arthritis who presents with knee pain.    She experiences knee pain with a severity of eight out of ten without medication, localized under the knee, and sometimes accompanied by clicking and popping, especially when using stairs. She is not engaged in physical therapy or home exercises.    Cortisone injections previously provided relief for over a year.  Her last injections were about 2 years ago.  She discontinued oral steroids and meloxicam due to stomach issues. She is not currently taking any pain medication, including Tylenol.       PAST MEDICAL HISTORY:  Past Medical History:    Acid reflux    Anxiety    Arthritis    Cancer (HCC)    Cataract    Esophageal reflux    Essential hypertension    High blood pressure    History of colon cancer    Diagnosed and treated in [facility] with surgery and radiotherapy    Panic attack    Primary osteoarthritis of both knees    Visual impairment       SURGICAL HISTORY:  Past Surgical History:   Procedure Laterality Date    Cataract      Egd  2014    in [facility]       SOCIAL HISTORY:   Social History     Occupational History    Not on file   Tobacco Use    Smoking status: Never    Smokeless tobacco: Never   Substance and Sexual Activity    Alcohol use: No    Drug use: No    Sexual activity: Not on file       FAMILY HISTORY:   Family History   Problem Relation Age of Onset    Colon Cancer Self        CURRENT MEDICATIONS:   Current Outpatient Medications   Medication Sig Dispense Refill     hydroCHLOROthiazide 12.5 MG Oral Cap Take 1 capsule (12.5 mg total) by mouth daily as needed (leg swelling). 90 capsule 3    pantoprazole 40 MG Oral Tab EC Take 1 tablet (40 mg total) by mouth every morning before breakfast. 90 tablet 0    alendronate 70 MG Oral Tab Take 1 tablet (70 mg total) by mouth every 7 days. Take alone on an empty stomach first thing in the morning with at least 240 mL (8 oz) of water. After administration, do not have food, drink, medications, or supplements for at least one half-hour 12 tablet 3    PANTOPRAZOLE 40 MG Oral Tab EC Take 1 tablet by mouth every morning before breakfast. 90 tablet 3    PAROXETINE HCL 10 MG Oral Tab TAKE 1 TABLET BY MOUTH EVERY DAY IN THE MORNING 30 tablet 0    Montelukast Sodium 10 MG Oral Tab Take 1 tablet (10 mg total) by mouth nightly. 30 tablet 3    ergocalciferol 29621 units Oral Cap   2    AmLODIPine Besylate 10 MG Oral Tab   2    Meclizine HCl 25 MG Oral Tab Take 1 tablet (25 mg total) by mouth 3 (three) times daily as needed. 20 tablet 0    ketoconazole (NIZORAL) 2 % Apply Externally Cream Apply 1 Application topically 2 (two) times daily. 45 g 0       ALLERGIES:   Allergies[1]    REVIEW OF SYSTEMS:   Review of Systems   Constitutional: Negative.    HENT: Negative.    Eyes: Negative.    Respiratory: Negative.    Cardiovascular: Negative.    Gastrointestinal: Negative.    Genitourinary: Negative.    Musculoskeletal: As per HPI   Skin: Negative.    Neurological: As per HPI  Endo/Heme/Allergies: Negative.    Psychiatric/Behavioral: Negative.      All other systems reviewed and are negative. Pertinent positives and negatives noted in the HPI.      PHYSICAL EXAM:   Ht 62\"   Wt 179 lb (81.2 kg)   BMI 32.74 kg/m²     Body mass index is 32.74 kg/m².    General: No immediate distress  Head: Normocephalic/ Atraumatic  Eyes: Extra-occular movements intact.   Ears: No auricular hematoma or deformities  Mouth: No lesions or ulcerations  Heart: peripheral pulses  intact. Normal capillary refill.   Lungs: Non-labored respirations  Abdomen: No abdominal guarding  Extremities: No lower extremity edema bilaterally   Skin: No lesions noted.   Cognition: alert & oriented x 3, attentive, able to follow 2 step commands, comprehension intact, spontaneous speech intact  Motor:    Musculoskeletal:    KNEE:  Inspection: no erythema, swelling, or obvious deformity  Palpation: To palpation over the medial joint lines bilaterally as well as medial and lateral patellar facet  ROM: Full active ROM in flexion and extension  Benjamin Test: negative for pain over patella  Lachmans: negative for instability  Anterior / Posterior drawer: negative for instability  Valgus/Varus stress test: negative for instablity  Tayler Test: negative for medial or lateral joint line pain or \"catch\"      Gait:  Antalgic    Data  No visits with results within 6 Month(s) from this visit.   Latest known visit with results is:   [facility] Lab Encounter on 03/14/2024   Component Date Value Ref Range Status    CEA  03/14/2024 <0.5  <=5.0 ng/mL Final    Carbohydrate Ag 19-9  03/14/2024 16.7  <=35.0 U/mL Final    Glucose 03/14/2024 111 (H)  70 - 99 mg/dL Final    Sodium 03/14/2024 141  136 - 145 mmol/L Final    Potassium 03/14/2024 4.9  3.5 - 5.1 mmol/L Final    Chloride 03/14/2024 104  98 - 112 mmol/L Final    CO2 03/14/2024 31.0  21.0 - 32.0 mmol/L Final    Anion Gap 03/14/2024 6  0 - 18 mmol/L Final    BUN 03/14/2024 15  9 - 23 mg/dL Final    Creatinine 03/14/2024 0.84  0.55 - 1.02 mg/dL Final    BUN/CREA Ratio 03/14/2024 17.9  10.0 - 20.0 Final    Calcium, Total 03/14/2024 10.3  8.7 - 10.4 mg/dL Final    Calculated Osmolality 03/14/2024 294  275 - 295 mOsm/kg Final    eGFR-Cr 03/14/2024 71  >=60 mL/min/1.73m2 Final    ALT 03/14/2024 16  10 - 49 U/L Final    AST 03/14/2024 21  <=34 U/L Final    Alkaline Phosphatase 03/14/2024 85  55 - 142 U/L Final    Bilirubin, Total 03/14/2024 0.6  0.2 - 1.1 mg/dL Final    Total Protein  03/14/2024 7.2  5.7 - 8.2 g/dL Final    Albumin 03/14/2024 4.4  3.2 - 4.8 g/dL Final    Globulin  03/14/2024 2.8  2.8 - 4.4 g/dL Final    A/G Ratio 03/14/2024 1.6  1.0 - 2.0 Final    Patient Fasting for CMP? 03/14/2024 Yes   Final    HgbA1C 03/14/2024 5.8 (H)  <5.7 % Final    Estimated Average Glucose 03/14/2024 120  68 - 126 mg/dL Final    Cholesterol, Total 03/14/2024 227 (H)  <200 mg/dL Final    HDL Cholesterol 03/14/2024 60 (H)  40 - 59 mg/dL Final    Triglycerides 03/14/2024 116  30 - 149 mg/dL Final    LDL Cholesterol 03/14/2024 146 (H)  <100 mg/dL Final    VLDL 03/14/2024 22  0 - 30 mg/dL Final    Non HDL Chol 03/14/2024 167 (H)  <130 mg/dL Final    Patient Fasting for Lipid? 03/14/2024 Yes   Final    TSH 03/14/2024 1.057  0.550 - 4.780 mIU/mL Final    Vitamin D, 25OH, Total 03/14/2024 50.0  30.0 - 100.0 ng/mL Final    Vitamin B12 03/14/2024 850  211 - 911 pg/mL Final    WBC 03/14/2024 6.9  4.0 - 11.0 x10(3) uL Final    RBC 03/14/2024 5.08  3.80 - 5.30 x10(6)uL Final    HGB 03/14/2024 15.3  12.0 - 16.0 g/dL Final    HCT 03/14/2024 45.6  35.0 - 48.0 % Final    MCV 03/14/2024 89.8  80.0 - 100.0 fL Final    MCH 03/14/2024 30.1  26.0 - 34.0 pg Final    MCHC 03/14/2024 33.6  31.0 - 37.0 g/dL Final    RDW-SD 03/14/2024 40.2  35.1 - 46.3 fL Final    RDW 03/14/2024 12.2  11.0 - 15.0 % Final    PLT 03/14/2024 249.0  150.0 - 450.0 10(3)uL Final    Neutrophil Absolute Prelim 03/14/2024 4.76  1.50 - 7.70 x10 (3) uL Final    Neutrophil Absolute 03/14/2024 4.76  1.50 - 7.70 x10(3) uL Final    Lymphocyte Absolute 03/14/2024 1.24  1.00 - 4.00 x10(3) uL Final    Monocyte Absolute 03/14/2024 0.66  0.10 - 1.00 x10(3) uL Final    Eosinophil Absolute 03/14/2024 0.13  0.00 - 0.70 x10(3) uL Final    Basophil Absolute 03/14/2024 0.06  0.00 - 0.20 x10(3) uL Final    Immature Granulocyte Absolute 03/14/2024 0.01  0.00 - 1.00 x10(3) uL Final    Neutrophil % 03/14/2024 69.4  % Final    Lymphocyte % 03/14/2024 18.1  % Final    Monocyte %  03/14/2024 9.6  % Final    Eosinophil % 03/14/2024 1.9  % Final    Basophil % 03/14/2024 0.9  % Final    Immature Granulocyte % 03/14/2024 0.1  % Final   ]    Radiology Imaging:  I reviewed with the patient her X-ray of the knees bilateral   XR KNEE (3 VIEWS) AP LAT OBL BILAT (GKI=[id number])  Narrative: PROCEDURE: XR KNEE (3 VIEWS) AP LAT OBL BILAT EM (CPT=73562)     COMPARISON: [facility], XR KNEE BILAT EM (IIG=[id number]), 2/24/2023, 4:31 PM.     INDICATIONS: Chronic bilateral knee diffused pain. No known recent injury.     TECHNIQUE: 5 views were obtained.       FINDINGS:      Bone mineralization is minimally diminished.     There is no acute fracture/dislocation.     There are moderate to severe symmetric degenerative changes within both knees manifested by bony hypertrophy, articular space narrowing, subarticular sclerosis, and tibial spine sharpening.                 Impression: CONCLUSION: Moderate to severe symmetric degenerative changes within both knees.           Dictated by (CST): Zay Martínez MD on 1/24/2025 at 9:59 AM       Finalized by (CST): Zay Martínez MD on 1/24/2025 at 10:00 AM            ASSESSMENT AND PLAN:  The patient is a pleasant 80-year-old female who presents for complaints of bilateral knee pain due to osteoarthritis patellofemoral syndrome.  Recommending physical therapy, Tylenol, and hyaluronic acid with corticosteroid injections.     Assessment & Plan  Knee Osteoarthritis  Chronic bilateral knee pain, primarily medial. X-rays confirm arthritis. Previous cortisone injections effective. Oral steroids and meloxicam discontinued due to GI side effects.  - Initiate physical therapy immediately.  - Use acetaminophen as needed.  - Schedule steroid and hyaluronic acid injections in April.  - Recommend glucosamine with chondroitin.  - Recommend turmeric with black pepper, 1000 mg twice daily.  - Provide contact for physical therapy scheduling.    Cancer  Diagnosed  in 2009, treated with radiation and surgery. Surgical complications noted.    Medication Management  Oral steroids and meloxicam discontinued due to GI side effects. Focus on acetaminophen for pain management.  - Avoid oral steroids and meloxicam.  - Use acetaminophen for pain management.    RTC in 2 months  Discharge Instructions were provided as documented in AVS summary.  The patient was in agreement with the assessment and plan.  All questions were answered.  There were no barriers to learning.         1. Patellofemoral pain syndrome, unspecified laterality    2. Primary osteoarthritis of knee, unspecified laterality    3. NSAID induced gastritis    4. Hypertension, unspecified type    5. Gastroesophageal reflux disease without esophagitis    6. Chronic pain of both knees        Alex B. Behar MD  Physical Medicine and Rehabilitation/Sports Medicine  [address] Cures Act Notice to Patient: Medical documents like this are made available to patients in the interest of transparency. However, be advised this is a medical document and it is intended as vill-uf-avwd communication between your medical providers. This medical document may contain abbreviations, assessments, medical data, and results or other terms that are unfamiliar. Medical documents are intended to carry relevant information, facts as evident, and the clinical opinion of the practitioner. As such, this medical document may be written in language that appears blunt or direct. You are encouraged to contact your medical provider and/or [facility] Patient Experience if you have any questions about this medical document.   Abridge tool was used for dictation purposes only and the patient was not recorded at any point during the visit.          [1] No Known Allergies

## 2025-03-20 ENCOUNTER — TELEPHONE (OUTPATIENT)
Dept: PHYSICAL MEDICINE AND REHAB | Facility: CLINIC | Age: 80
End: 2025-03-20

## 2025-03-20 ENCOUNTER — OFFICE VISIT (OUTPATIENT)
Dept: INTERNAL MEDICINE CLINIC | Facility: CLINIC | Age: 80
End: 2025-03-20

## 2025-03-20 VITALS
HEIGHT: 62 IN | DIASTOLIC BLOOD PRESSURE: 82 MMHG | HEART RATE: 69 BPM | RESPIRATION RATE: 18 BRPM | TEMPERATURE: 98 F | OXYGEN SATURATION: 97 % | BODY MASS INDEX: 32.57 KG/M2 | SYSTOLIC BLOOD PRESSURE: 130 MMHG | WEIGHT: 177 LBS

## 2025-03-20 DIAGNOSIS — R73.09 ELEVATED GLUCOSE: ICD-10-CM

## 2025-03-20 DIAGNOSIS — G89.29 ABDOMINAL PAIN, CHRONIC, GENERALIZED: ICD-10-CM

## 2025-03-20 DIAGNOSIS — F41.9 ANXIETY: ICD-10-CM

## 2025-03-20 DIAGNOSIS — M81.0 AGE-RELATED OSTEOPOROSIS WITHOUT CURRENT PATHOLOGICAL FRACTURE: ICD-10-CM

## 2025-03-20 DIAGNOSIS — M25.562 CHRONIC PAIN OF BOTH KNEES: ICD-10-CM

## 2025-03-20 DIAGNOSIS — K59.00 CONSTIPATION, UNSPECIFIED CONSTIPATION TYPE: ICD-10-CM

## 2025-03-20 DIAGNOSIS — I10 HYPERTENSION, UNSPECIFIED TYPE: ICD-10-CM

## 2025-03-20 DIAGNOSIS — Z13.29 THYROID DISORDER SCREEN: ICD-10-CM

## 2025-03-20 DIAGNOSIS — K21.9 GASTROESOPHAGEAL REFLUX DISEASE, UNSPECIFIED WHETHER ESOPHAGITIS PRESENT: ICD-10-CM

## 2025-03-20 DIAGNOSIS — G89.29 CHRONIC PAIN OF BOTH KNEES: ICD-10-CM

## 2025-03-20 DIAGNOSIS — Z00.00 ENCOUNTER FOR PREVENTIVE CARE: ICD-10-CM

## 2025-03-20 DIAGNOSIS — M25.561 CHRONIC PAIN OF BOTH KNEES: ICD-10-CM

## 2025-03-20 DIAGNOSIS — Z85.038 HISTORY OF COLON CANCER: ICD-10-CM

## 2025-03-20 DIAGNOSIS — Z12.31 SCREENING MAMMOGRAM FOR BREAST CANCER: ICD-10-CM

## 2025-03-20 DIAGNOSIS — Z00.00 ANNUAL PHYSICAL EXAM: Primary | ICD-10-CM

## 2025-03-20 DIAGNOSIS — R10.84 ABDOMINAL PAIN, CHRONIC, GENERALIZED: ICD-10-CM

## 2025-03-20 PROCEDURE — 99397 PER PM REEVAL EST PAT 65+ YR: CPT | Performed by: INTERNAL MEDICINE

## 2025-03-20 NOTE — TELEPHONE ENCOUNTER
Initiated authorization for BILATERAL knee HA and CSI under . CPT/HCPCS 28563-84,  x's 2,  x's 2 dx:M17.0 with [employer] and [employer] portals.    Status: Approved for [place]  Reference/Authorization # [id number]  Valid: 3/20/25-9/16/25  Authorization is not a guarantee of payment and may be subject to review once claim is submitted.       Status: No action required for CPT codes 85240-77 and  x's 2  Reference/Authorization # [id number]  Valid: 3/20/25-6/20/25  Authorization is not required based on medical necessity, however, is not a guarantee of payment and may be subject to review once claim is submitted.

## 2025-03-21 ENCOUNTER — LAB ENCOUNTER (OUTPATIENT)
Dept: LAB | Facility: HOSPITAL | Age: 80
End: 2025-03-21
Attending: INTERNAL MEDICINE
Payer: MEDICAID

## 2025-03-21 ENCOUNTER — TELEPHONE (OUTPATIENT)
Dept: PHYSICAL THERAPY | Age: 80
End: 2025-03-21

## 2025-03-21 DIAGNOSIS — R73.09 ELEVATED GLUCOSE: ICD-10-CM

## 2025-03-21 DIAGNOSIS — G89.29 ABDOMINAL PAIN, CHRONIC, GENERALIZED: ICD-10-CM

## 2025-03-21 DIAGNOSIS — R10.84 ABDOMINAL PAIN, CHRONIC, GENERALIZED: ICD-10-CM

## 2025-03-21 DIAGNOSIS — Z85.038 HISTORY OF COLON CANCER: ICD-10-CM

## 2025-03-21 DIAGNOSIS — R94.31 ABNORMAL EKG: ICD-10-CM

## 2025-03-21 DIAGNOSIS — Z13.29 THYROID DISORDER SCREEN: ICD-10-CM

## 2025-03-21 DIAGNOSIS — K59.00 CONSTIPATION, UNSPECIFIED CONSTIPATION TYPE: ICD-10-CM

## 2025-03-21 DIAGNOSIS — Z00.00 ANNUAL PHYSICAL EXAM: ICD-10-CM

## 2025-03-21 DIAGNOSIS — Z12.31 SCREENING MAMMOGRAM FOR BREAST CANCER: ICD-10-CM

## 2025-03-21 DIAGNOSIS — I10 HYPERTENSION, UNSPECIFIED TYPE: ICD-10-CM

## 2025-03-21 DIAGNOSIS — E78.5 HYPERLIPIDEMIA, UNSPECIFIED HYPERLIPIDEMIA TYPE: Primary | ICD-10-CM

## 2025-03-21 LAB
ALBUMIN SERPL-MCNC: 4.5 G/DL (ref 3.2–4.8)
ALBUMIN/GLOB SERPL: 1.9 {RATIO} (ref 1–2)
ALP LIVER SERPL-CCNC: 86 U/L
ALT SERPL-CCNC: 13 U/L
ANION GAP SERPL CALC-SCNC: 6 MMOL/L (ref 0–18)
AST SERPL-CCNC: 16 U/L (ref ?–34)
ATRIAL RATE: 68 BPM
BASOPHILS # BLD AUTO: 0.05 X10(3) UL (ref 0–0.2)
BASOPHILS NFR BLD AUTO: 0.8 %
BILIRUB SERPL-MCNC: 0.6 MG/DL (ref 0.2–1.1)
BILIRUB UR QL: NEGATIVE
BUN BLD-MCNC: 13 MG/DL (ref 9–23)
BUN/CREAT SERPL: 16 (ref 10–20)
CALCIUM BLD-MCNC: 9.7 MG/DL (ref 8.7–10.4)
CHLORIDE SERPL-SCNC: 104 MMOL/L (ref 98–112)
CHOLEST SERPL-MCNC: 222 MG/DL (ref ?–200)
CLARITY UR: CLEAR
CO2 SERPL-SCNC: 33 MMOL/L (ref 21–32)
COLOR UR: YELLOW
CREAT BLD-MCNC: 0.81 MG/DL
DEPRECATED RDW RBC AUTO: 39.7 FL (ref 35.1–46.3)
EGFRCR SERPLBLD CKD-EPI 2021: 73 ML/MIN/1.73M2 (ref 60–?)
EOSINOPHIL # BLD AUTO: 0.17 X10(3) UL (ref 0–0.7)
EOSINOPHIL NFR BLD AUTO: 2.6 %
ERYTHROCYTE [DISTWIDTH] IN BLOOD BY AUTOMATED COUNT: 12.1 % (ref 11–15)
EST. AVERAGE GLUCOSE BLD GHB EST-MCNC: 120 MG/DL (ref 68–126)
FASTING PATIENT LIPID ANSWER: YES
FASTING STATUS PATIENT QL REPORTED: YES
GLOBULIN PLAS-MCNC: 2.4 G/DL (ref 2–3.5)
GLUCOSE BLD-MCNC: 117 MG/DL (ref 70–99)
GLUCOSE UR-MCNC: NORMAL MG/DL
HBA1C MFR BLD: 5.8 % (ref ?–5.7)
HCT VFR BLD AUTO: 45.5 %
HDLC SERPL-MCNC: 66 MG/DL (ref 40–59)
HGB BLD-MCNC: 15.4 G/DL
IMM GRANULOCYTES # BLD AUTO: 0.01 X10(3) UL (ref 0–1)
IMM GRANULOCYTES NFR BLD: 0.2 %
KETONES UR-MCNC: NEGATIVE MG/DL
LDLC SERPL CALC-MCNC: 134 MG/DL (ref ?–100)
LEUKOCYTE ESTERASE UR QL STRIP.AUTO: 500
LYMPHOCYTES # BLD AUTO: 1.49 X10(3) UL (ref 1–4)
LYMPHOCYTES NFR BLD AUTO: 22.7 %
MCH RBC QN AUTO: 30.4 PG (ref 26–34)
MCHC RBC AUTO-ENTMCNC: 33.8 G/DL (ref 31–37)
MCV RBC AUTO: 89.7 FL
MONOCYTES # BLD AUTO: 0.63 X10(3) UL (ref 0.1–1)
MONOCYTES NFR BLD AUTO: 9.6 %
NEUTROPHILS # BLD AUTO: 4.21 X10 (3) UL (ref 1.5–7.7)
NEUTROPHILS # BLD AUTO: 4.21 X10(3) UL (ref 1.5–7.7)
NEUTROPHILS NFR BLD AUTO: 64.1 %
NITRITE UR QL STRIP.AUTO: NEGATIVE
NONHDLC SERPL-MCNC: 156 MG/DL (ref ?–130)
OSMOLALITY SERPL CALC.SUM OF ELEC: 297 MOSM/KG (ref 275–295)
P AXIS: 21 DEGREES
P-R INTERVAL: 154 MS
PH UR: 7 [PH] (ref 5–8)
PLATELET # BLD AUTO: 258 10(3)UL (ref 150–450)
POTASSIUM SERPL-SCNC: 4 MMOL/L (ref 3.5–5.1)
PROT SERPL-MCNC: 6.9 G/DL (ref 5.7–8.2)
Q-T INTERVAL: 432 MS
QRS DURATION: 72 MS
QTC CALCULATION (BEZET): 459 MS
R AXIS: 19 DEGREES
RBC # BLD AUTO: 5.07 X10(6)UL
SODIUM SERPL-SCNC: 143 MMOL/L (ref 136–145)
SP GR UR STRIP: 1.02 (ref 1–1.03)
T AXIS: 27 DEGREES
TRIGL SERPL-MCNC: 124 MG/DL (ref 30–149)
TSI SER-ACNC: 1.46 UIU/ML (ref 0.55–4.78)
UROBILINOGEN UR STRIP-ACNC: NORMAL
VENTRICULAR RATE: 68 BPM
VLDLC SERPL CALC-MCNC: 23 MG/DL (ref 0–30)
WBC # BLD AUTO: 6.6 X10(3) UL (ref 4–11)

## 2025-03-21 PROCEDURE — 93010 ELECTROCARDIOGRAM REPORT: CPT | Performed by: INTERNAL MEDICINE

## 2025-03-21 PROCEDURE — 83036 HEMOGLOBIN GLYCOSYLATED A1C: CPT

## 2025-03-21 PROCEDURE — 93005 ELECTROCARDIOGRAM TRACING: CPT

## 2025-03-21 PROCEDURE — 81001 URINALYSIS AUTO W/SCOPE: CPT

## 2025-03-21 PROCEDURE — 36415 COLL VENOUS BLD VENIPUNCTURE: CPT

## 2025-03-21 PROCEDURE — 80053 COMPREHEN METABOLIC PANEL: CPT

## 2025-03-21 PROCEDURE — 84443 ASSAY THYROID STIM HORMONE: CPT

## 2025-03-21 PROCEDURE — 80061 LIPID PANEL: CPT

## 2025-03-21 PROCEDURE — 85025 COMPLETE CBC W/AUTO DIFF WBC: CPT

## 2025-03-21 RX ORDER — SULFAMETHOXAZOLE AND TRIMETHOPRIM 800; 160 MG/1; MG/1
1 TABLET ORAL 2 TIMES DAILY
Qty: 10 TABLET | Refills: 0 | Status: SHIPPED | OUTPATIENT
Start: 2025-03-21 | End: 2025-03-26

## 2025-03-22 RX ORDER — ATORVASTATIN CALCIUM 20 MG/1
20 TABLET, FILM COATED ORAL NIGHTLY
Qty: 90 TABLET | Refills: 1 | Status: SHIPPED | OUTPATIENT
Start: 2025-03-22

## 2025-03-23 NOTE — PROGRESS NOTES
Subjective:     Patient ID: Cynthia Mata is a 80 year old female.    Patient patient comes in for annual physical denies any new complaints seen physiatry for her knee pain has shots done doing better  Patient also with chronic abdominal pain generalized almost daily at times is constipated         History/Other:   Review of Systems   Constitutional: Negative.    HENT: Negative.     Eyes: Negative.    Respiratory: Negative.     Cardiovascular: Negative.    Gastrointestinal:  Positive for abdominal pain and constipation. Negative for nausea and vomiting.        Generalized   Genitourinary: Negative.    Musculoskeletal: Negative.    Skin: Negative.    Neurological: Negative.    Psychiatric/Behavioral: Negative.       Current Outpatient Medications   Medication Sig Dispense Refill    hydroCHLOROthiazide 12.5 MG Oral Cap Take 1 capsule (12.5 mg total) by mouth daily as needed (leg swelling). 90 capsule 3    pantoprazole 40 MG Oral Tab EC Take 1 tablet (40 mg total) by mouth every morning before breakfast. 90 tablet 0    alendronate 70 MG Oral Tab Take 1 tablet (70 mg total) by mouth every 7 days. Take alone on an empty stomach first thing in the morning with at least 240 mL (8 oz) of water. After administration, do not have food, drink, medications, or supplements for at least one half-hour 12 tablet 3    PANTOPRAZOLE 40 MG Oral Tab EC Take 1 tablet by mouth every morning before breakfast. 90 tablet 3    PAROXETINE HCL 10 MG Oral Tab TAKE 1 TABLET BY MOUTH EVERY DAY IN THE MORNING 30 tablet 0    Montelukast Sodium 10 MG Oral Tab Take 1 tablet (10 mg total) by mouth nightly. 30 tablet 3    ergocalciferol 39547 units Oral Cap   2    AmLODIPine Besylate 10 MG Oral Tab   2    Meclizine HCl 25 MG Oral Tab Take 1 tablet (25 mg total) by mouth 3 (three) times daily as needed. 20 tablet 0    ketoconazole (NIZORAL) 2 % Apply Externally Cream Apply 1 Application topically 2 (two) times daily. 45 g 0    atorvastatin 20 MG Oral  Tab Take 1 tablet (20 mg total) by mouth nightly. 90 tablet 1    sulfamethoxazole-trimethoprim -160 MG Oral Tab per tablet Take 1 tablet by mouth 2 (two) times daily for 5 days. 10 tablet 0     Allergies:Allergies[1]    Past Medical History:    Acid reflux    Anxiety    Arthritis    Cancer (HCC)    Cataract    Esophageal reflux    Essential hypertension    High blood pressure    History of colon cancer    Diagnosed and treated in [facility] with surgery and radiotherapy    Panic attack    Primary osteoarthritis of both knees    Visual impairment      Past Surgical History:   Procedure Laterality Date    Cataract      Colonoscopy      Egd  2014    in [facility]      Family History   Problem Relation Age of Onset    Colon Cancer Self       Social History:   Social History     Socioeconomic History    Marital status:    Tobacco Use    Smoking status: Never     Passive exposure: Never    Smokeless tobacco: Never   Vaping Use    Vaping status: Never Used   Substance and Sexual Activity    Alcohol use: No    Drug use: No        Objective:   Physical Exam  Vitals and nursing note reviewed.   Constitutional:       Appearance: She is well-developed.   HENT:      Head: Normocephalic and atraumatic.      Right Ear: External ear normal.      Left Ear: External ear normal.      Nose: Nose normal.   Eyes:      Conjunctiva/sclera: Conjunctivae normal.      Pupils: Pupils are equal, round, and reactive to light.   Cardiovascular:      Rate and Rhythm: Normal rate and regular rhythm.      Heart sounds: Normal heart sounds.   Pulmonary:      Effort: Pulmonary effort is normal.      Breath sounds: Normal breath sounds.   Abdominal:      General: Bowel sounds are normal.      Palpations: Abdomen is soft.      Tenderness: There is abdominal tenderness. There is no guarding.      Comments: Generalized abd pain    Genitourinary:     Vagina: Normal.   Musculoskeletal:         General: Normal range of motion.      Cervical back:  Normal range of motion and neck supple.   Skin:     General: Skin is warm and dry.   Neurological:      Mental Status: She is alert and oriented to person, place, and time.      Deep Tendon Reflexes: Reflexes are normal and symmetric.   Psychiatric:         Behavior: Behavior normal.         Thought Content: Thought content normal.         Judgment: Judgment normal.         Assessment & Plan:   1. Annual physical exam.  Exam is okay \will order  labs   2. Elevated glucose we will check A1c watch diet we    3. Thyroid disorder screen will check labs   4. Hypertension, unspecified type continue current treatment    5. Abdominal pain, chronic, generalized    6. History of colon cancer stable follows with specialist in [facility]   7. Constipation, unspecified constipation type increase fiber intake increase fluid intake    8. Screening mammogram for breast cancer we will order mammogram will follow results   9. Encounter for preventive care history referred to GI   10. Gastroesophageal reflux disease, unspecified whether esophagitis present watch diet continue current treatment   11. Anxiety medical management stable   12. Chronic pain of both knees doing better had shots done with physiatry   13. Age-related osteoporosis without current pathological fracture -medical management       Orders Placed This Encounter   Procedures    CBC With Differential With Platelet    Comp Metabolic Panel (14)    Lipid Panel    TSH W Reflex To Free T4    Urinalysis, Routine    Hemoglobin A1C    Occult Blood, Fecal, Immunoassay (Blue cards) [E]       Meds This Visit:  Requested Prescriptions      No prescriptions requested or ordered in this encounter       Imaging & Referrals:  [facility] VENITA 2D+3D SCREENING BILAT (CPT=77067/12110)  CT ABDOMEN+PELVIS(CPT=74176)            [1] No Known Allergies     negative No murmur/Symmetric upper and lower extremity pulses of normal amplitude/Normal S1, S2/No S3, S4/Regular rate and variability/No pericardial rub

## 2025-03-24 ENCOUNTER — LAB ENCOUNTER (OUTPATIENT)
Dept: LAB | Facility: HOSPITAL | Age: 80
End: 2025-03-24
Attending: INTERNAL MEDICINE
Payer: MEDICAID

## 2025-03-24 DIAGNOSIS — Z00.00 ENCOUNTER FOR PREVENTIVE CARE: ICD-10-CM

## 2025-03-24 PROCEDURE — 82274 ASSAY TEST FOR BLOOD FECAL: CPT

## 2025-03-25 ENCOUNTER — TELEPHONE (OUTPATIENT)
Dept: PHYSICAL THERAPY | Age: 80
End: 2025-03-25

## 2025-03-25 DIAGNOSIS — Z00.00 ROUTINE GENERAL MEDICAL EXAMINATION AT A HEALTH CARE FACILITY: Primary | ICD-10-CM

## 2025-03-27 ENCOUNTER — LAB ENCOUNTER (OUTPATIENT)
Dept: LAB | Facility: HOSPITAL | Age: 80
End: 2025-03-27
Attending: INTERNAL MEDICINE
Payer: MEDICAID

## 2025-03-27 DIAGNOSIS — Z00.00 ROUTINE GENERAL MEDICAL EXAMINATION AT A HEALTH CARE FACILITY: ICD-10-CM

## 2025-03-27 LAB — HEMOCCULT STL QL: POSITIVE

## 2025-03-27 PROCEDURE — 82274 ASSAY TEST FOR BLOOD FECAL: CPT

## 2025-03-28 DIAGNOSIS — R19.5 POSITIVE OCCULT STOOL BLOOD TEST: Primary | ICD-10-CM

## 2025-03-29 ENCOUNTER — TELEPHONE (OUTPATIENT)
Facility: CLINIC | Age: 80
End: 2025-03-29

## 2025-03-29 NOTE — TELEPHONE ENCOUNTER
----- Message from Elian Hahn sent at 3/28/2025  4:23 PM CDT -----  Stool test positive will need to see GI will CC GI to the message so she can be seen soon

## 2025-03-31 NOTE — H&P
[facility] - Gastroenterology                                                                                                  Clinic History and Physical     Chief Complaint   Patient presents with    Consult     For positive,history of colon cancer 2018       Requesting physician or provider: Elian Hahn MD    HPI:   Cynthia Mata is a 80 year old year-old female with history of htn, anxiety, hiatal hernia, gerd, globus sensation, OA bilateral knees, colon ca, who presents for colon cancer screening evaluation. Tammy (daughter) presents with pt to assist with interpretation of German. Pt only speaks German  Pt was diagnosed with colon ca in [place] in 2018, received radiation, in 2019 part of the colon was removed about 7 cm. Pt had f/us with pcp, was ordering ca markers; which were stable. Recently Lower abdominal pain, pt had urine test, positive for bacterial infection.     Additionally pcp ordered FIT which recently positive. Pt c/o increased abdominal pain lower abdominal and epigastric, constipation, tail bone pressure and pain, continued urinary burning and urgency, increased fatigue. Pt continues to have symptoms of UTI after a course of antibiotics.    Pt had a hx of gastritis/acid reflux, hiatal hernia. Pt started having increased epigastric pain and acid reflux, therefore decided to start taking pantoprazole (left over prescription from yrs ago), since it seems to help.     Reports hx of constipation. Currently pt states on average bowel movements 3-4 times per. Pt states at times she eats prunes for the management of constipation, however is not consistent    Patient denies any GI symptoms of nausea, vomiting, dysphagia, hematemesis, change in bowel habits, thin stools, hematochezia, or melena.  Additionally there is no weight loss and no reported history of chest pain or shortness of  breath.      Prior endoscopies:  Colonoscopy- last colonoscopy 2018  Edg- 12 yrs ago    Soc:  -No smoking  -denies Etoh    History, Medications, Allergies, ROS:      Past Medical History:    Acid reflux    Anxiety    Arthritis    Cancer (HCC)    Cataract    Colon cancer (HCC)    Colon polyp    Constipation    Esophageal reflux    Essential hypertension    High blood pressure    History of colon cancer    Diagnosed and treated in [facility] with surgery and radiotherapy    Panic attack    Primary osteoarthritis of both knees    Visual impairment      Past Surgical History:   Procedure Laterality Date    Cataract      Colectomy  2019    Colonoscopy      Egd  2014    in [facility]      Family Hx:   Family History   Problem Relation Age of Onset    Colon Cancer Self       Social History:   Social History     Socioeconomic History    Marital status:    Tobacco Use    Smoking status: Never     Passive exposure: Never    Smokeless tobacco: Never   Vaping Use    Vaping status: Never Used   Substance and Sexual Activity    Alcohol use: No    Drug use: No        Medications (Active prior to today's visit):  Current Outpatient Medications   Medication Sig Dispense Refill    atorvastatin 20 MG Oral Tab Take 1 tablet (20 mg total) by mouth nightly. 90 tablet 1    hydroCHLOROthiazide 12.5 MG Oral Cap Take 1 capsule (12.5 mg total) by mouth daily as needed (leg swelling). 90 capsule 3    pantoprazole 40 MG Oral Tab EC Take 1 tablet (40 mg total) by mouth every morning before breakfast. 90 tablet 0    alendronate 70 MG Oral Tab Take 1 tablet (70 mg total) by mouth every 7 days. Take alone on an empty stomach first thing in the morning with at least 240 mL (8 oz) of water. After administration, do not have food, drink, medications, or supplements for at least one half-hour 12 tablet 3    PANTOPRAZOLE 40 MG Oral Tab EC Take 1 tablet by mouth every morning before breakfast. 90 tablet 3    PAROXETINE HCL 10 MG Oral Tab TAKE 1 TABLET  BY MOUTH EVERY DAY IN THE MORNING 30 tablet 0    Montelukast Sodium 10 MG Oral Tab Take 1 tablet (10 mg total) by mouth nightly. 30 tablet 3    ergocalciferol 13049 units Oral Cap   2    AmLODIPine Besylate 10 MG Oral Tab   2    Meclizine HCl 25 MG Oral Tab Take 1 tablet (25 mg total) by mouth 3 (three) times daily as needed. 20 tablet 0    ketoconazole (NIZORAL) 2 % Apply Externally Cream Apply 1 Application topically 2 (two) times daily. 45 g 0       Allergies:  Allergies[1]    ROS:   CONSTITUTIONAL:  negative for fevers, rigors  EYES:  negative for diplopia   RESPIRATORY:  negative for severe shortness of breath  CARDIOVASCULAR:  negative for crushing sub-sternal chest pain  GASTROINTESTINAL:  see HPI  GENITOURINARY:  negative for dysuria or gross hematuria  INTEGUMENT/BREAST:  SKIN:  negative for jaundice   ALLERGIC/IMMUNOLOGIC:  negative for hay fever  ENDOCRINE:  negative for cold intolerance and heat intolerance  MUSCULOSKELETAL:  negative for joint effusion/severe erythema  BEHAVIOR/PSYCH:  negative for psychotic behavior      PHYSICAL EXAM:   Blood pressure 118/73, pulse 81, height 5' 2\" (1.575 m), weight 172 lb 3.2 oz (78.1 kg).    Gen- Patient appears comfortable and in no acute discomfort  HEENT: the sclera appears anicteric, oropharynx clear, mucus membranes appear moist  CV- regular rate and rhythm, the extremities are warm and well perfused   Lung- Moves air well; No labored breathing  Abdomen- soft, non-tender exam in all quadrants without rigidity or guarding, non-distended, no abnormal bowel sounds noted, no masses are palpated  Skin- No jaundice  Ext: no cyanosis, clubbing or edema is evident.   Neuro- Alert and interactive, and gross movements of extremities normal    Labs/Imaging:     Patient's labs and imaging were reviewed and discussed with patient today.      .  ASSESSMENT/PLAN:   Cynthia Mata is a 80 year old year-old female with history of htn, anxiety, hiatal hernia, gerd, globus  sensation, OA bilateral knees, colon ca, who presents for colon cancer screening evaluation. Tammy (daughter) presents with pt to assist with interpretation of [place]an. Pt only speaks [place]an  Pt was diagnosed with colon ca in [place] in 2018, received radiation, in 2019 part of the colon was removed about 7 cm. Pt had f/us with pcp, was ordering ca markers; which were stable. Recently Lower abdominal pain, pt had urine test, positive for bacterial infection.     Additionally pcp ordered FIT which recently positive. Pt c/o increased abdominal pain lower abdominal and epigastric, constipation, tail bone pressure and pain, continued urinary burning and urgency, increased fatigue. Pt continues to have symptoms of UTI after a course of antibiotics.    Pt had a hx of gastritis/acid reflux, hiatal hernia. Pt started having increased epigastric pain and acid reflux, therefore decided to start taking pantoprazole (left over prescription from yrs ago), since it seems to help.     Reports hx of constipation. Currently pt states on average bowel movements 3-4 times per. Pt states at times she eats prunes for the management of constipation, however is not consistent    Patient denies any GI symptoms of nausea, vomiting, dysphagia, hematemesis, change in bowel habits, thin stools, hematochezia, or melena.  Additionally there is no weight loss and no reported history of chest pain or shortness of breath.    1. Urinary tract infection without hematuria, site unspecified    2. History of colon cancer    3. Lower abdominal pain    4. Epigastric pain    5. Gastroesophageal reflux disease without esophagitis    6. Other fatigue    # UTI  - urinalysis and culture ordered  - follow up with pcp    # acid reflux, epigastric pain  - avoid triggers  - trial omeprazole 40mg daily 1/2 hr before meals    # constipation, lower abdominal pain, bloating  - trial miralax 1 scoop twice day for 1 month, then decrease to daily 1 month, decrease  to every other day  - increase fiber and fluids  - light exercise  - trial prunes/papaya daily    # hx of colon ca 2018/ + FIT  - Urgent colonoscopy ordered      Recommend:  -Schedule colonoscopy   -Prep: Trilyte Prep     BP Meds: amLODIPine Tabs - 10 MG; hydroCHLOROthiazide Caps - 12.5 MG        Colonoscopy consent: I have discussed the risks, benefits, and alternatives to colonoscopy with the patient [who demonstrated understanding], including but not limited to the risks of bleeding, infection, pain, death, as well as the risks of anesthesia and perforation all leading to prolonged hospitalization, surgical intervention, or even death. I also specifically mentioned the miss rate of colonoscopy of 5-10% in the best of all circumstances. All questions were answered to the patient’s satisfaction. The patient signed informed consent and elected to proceed with colonoscopy with intervention [i.e. polypectomy, stent placement, etc.] as indicated.      Orders This Visit:  No orders of the defined types were placed in this encounter.      Meds This Visit:  Requested Prescriptions      No prescriptions requested or ordered in this encounter       Imaging & Referrals:  None         Maegan Wolf, APRN   4/3/2025          [1] No Known Allergies

## 2025-03-31 NOTE — TELEPHONE ENCOUNTER
RN called pt with help of Urdu  [id number], no answer so left message.    RN called number a second time and her daughter answered. Daughter spoke english and states she is in a meeting and asked to call back later today.

## 2025-03-31 NOTE — TELEPHONE ENCOUNTER
RN called and spoke to pt's daughter. Pt scheduled for clinic on 4/3/25. Date, time, and location verified with daughter. Daughter verbalized understanding.    Your Appointments      Thursday April 03, 2025 10:00 AM  Consult with CHRISTIANE Serrano  [facility] ([facility]) [address] [address]  [phone]

## 2025-04-03 ENCOUNTER — TELEPHONE (OUTPATIENT)
Facility: CLINIC | Age: 80
End: 2025-04-03

## 2025-04-03 ENCOUNTER — TELEPHONE (OUTPATIENT)
Dept: CASE MANAGEMENT | Age: 80
End: 2025-04-03

## 2025-04-03 ENCOUNTER — OFFICE VISIT (OUTPATIENT)
Facility: CLINIC | Age: 80
End: 2025-04-03
Payer: MEDICAID

## 2025-04-03 ENCOUNTER — PATIENT MESSAGE (OUTPATIENT)
Dept: CASE MANAGEMENT | Age: 80
End: 2025-04-03

## 2025-04-03 ENCOUNTER — LAB ENCOUNTER (OUTPATIENT)
Dept: LAB | Facility: HOSPITAL | Age: 80
End: 2025-04-03
Attending: ANESTHESIOLOGY
Payer: MEDICAID

## 2025-04-03 VITALS
DIASTOLIC BLOOD PRESSURE: 73 MMHG | SYSTOLIC BLOOD PRESSURE: 118 MMHG | WEIGHT: 172.19 LBS | BODY MASS INDEX: 31.68 KG/M2 | HEART RATE: 81 BPM | HEIGHT: 62 IN

## 2025-04-03 DIAGNOSIS — R14.0 BLOATING: ICD-10-CM

## 2025-04-03 DIAGNOSIS — K21.9 GASTROESOPHAGEAL REFLUX DISEASE WITHOUT ESOPHAGITIS: ICD-10-CM

## 2025-04-03 DIAGNOSIS — K59.00 CONSTIPATION, UNSPECIFIED CONSTIPATION TYPE: Primary | ICD-10-CM

## 2025-04-03 DIAGNOSIS — R53.83 OTHER FATIGUE: ICD-10-CM

## 2025-04-03 DIAGNOSIS — R10.30 LOWER ABDOMINAL PAIN: ICD-10-CM

## 2025-04-03 DIAGNOSIS — N39.0 URINARY TRACT INFECTION WITHOUT HEMATURIA, SITE UNSPECIFIED: Primary | ICD-10-CM

## 2025-04-03 DIAGNOSIS — N39.0 URINARY TRACT INFECTION WITHOUT HEMATURIA, SITE UNSPECIFIED: ICD-10-CM

## 2025-04-03 DIAGNOSIS — R10.13 EPIGASTRIC PAIN: ICD-10-CM

## 2025-04-03 DIAGNOSIS — R19.5 POSITIVE FIT (FECAL IMMUNOCHEMICAL TEST): ICD-10-CM

## 2025-04-03 DIAGNOSIS — R10.84 GENERALIZED ABDOMINAL PAIN: Primary | ICD-10-CM

## 2025-04-03 DIAGNOSIS — Z85.038 HISTORY OF COLON CANCER: ICD-10-CM

## 2025-04-03 DIAGNOSIS — K59.00 CONSTIPATION, UNSPECIFIED CONSTIPATION TYPE: ICD-10-CM

## 2025-04-03 LAB
BILIRUB UR QL: NEGATIVE
CLARITY UR: CLEAR
GLUCOSE UR-MCNC: NORMAL MG/DL
KETONES UR-MCNC: NEGATIVE MG/DL
LEUKOCYTE ESTERASE UR QL STRIP.AUTO: 75
NITRITE UR QL STRIP.AUTO: NEGATIVE
PH UR: 7 [PH] (ref 5–8)
PROT UR-MCNC: NEGATIVE MG/DL
SP GR UR STRIP: 1.01 (ref 1–1.03)
UROBILINOGEN UR STRIP-ACNC: NORMAL

## 2025-04-03 PROCEDURE — 99204 OFFICE O/P NEW MOD 45 MIN: CPT

## 2025-04-03 PROCEDURE — 81001 URINALYSIS AUTO W/SCOPE: CPT

## 2025-04-03 PROCEDURE — 87086 URINE CULTURE/COLONY COUNT: CPT

## 2025-04-03 RX ORDER — OMEPRAZOLE 40 MG/1
40 CAPSULE, DELAYED RELEASE ORAL DAILY
Qty: 90 CAPSULE | Refills: 0 | Status: SHIPPED | OUTPATIENT
Start: 2025-04-03 | End: 2025-07-02

## 2025-04-03 RX ORDER — POLYETHYLENE GLYCOL 3350 17 G/17G
17 POWDER, FOR SOLUTION ORAL DAILY
Qty: 238 G | Refills: 0 | Status: SHIPPED | OUTPATIENT
Start: 2025-04-03

## 2025-04-03 NOTE — TELEPHONE ENCOUNTER
Called and spoke to patient's daughter, Jenn, per XIOMARA, verified last name and date of birth. Informed Jenn, her Mom's CT scan of abdomen was denied by insurance. Dr. Hahn has ordered US of abdomen  and then if needed will order CT. Provided daughter central scheduling phone number. Daughter understanding.    Dr. Hahn, Patient is having a colonoscopy Tuesday, April 8th, Dr. Shore.

## 2025-04-03 NOTE — TELEPHONE ENCOUNTER
CT Abdomen / Pelvis        Status: DENIED        Reference number [id number]     A copy of the denial letter is filed under the MEDIA tab, reference for complete details. You may reach out to [employer] at [phone] Opt # 4 to discuss decision.     Please reach out to patient with plan of care.      Thank you      You are allotted an additional seven calendar days from the notification of adverse  determination to send one set of additional clinical or complete a [id number]  discussion with a Medical Director (by calling [phone], select Option 4).  Reference number [id number] will need to be entered or provided. This must be  completed within 7 calendar days from the date of this letter.      Imaging can be done for either of these results.   Prior imaging (ultrasound) did not explain the cause of your pain.   Lab tests did not point to a specific reason for your pain. The notes sent to us do not  describe either of these results.

## 2025-04-03 NOTE — PATIENT INSTRUCTIONS
# UTI  - urinalysis and culture ordered  - follow up with pcp    # acid reflux, epigastric pain  - avoid triggers  - trial omeprazole 40mg daily 1/2 hr before meals    # constipation, lower abdominal pain, bloating  - trial miralax 1 scoop twice day for 1 month, then decrease to daily 1 month, decrease to every other day  - increase fiber and fluids  - light exercise  - trial prunes/papaya daily    # hx of colon ca 2018/ + FIT  - Urgent colonoscopy ordered    1. Schedule colonoscopy with MAC w/ Urgent pool MD [Diagnosis: constipation, lower abdominal pain, bloating, +fit]    2.  bowel prep from pharmacy: Prep: golytely      BP Meds: amLODIPine Tabs - 10 MG; hydroCHLOROthiazide Caps - 12.5 MG      For cardiology patients and patients on blood thinners:  Please contact your cardiology clinic for clearance to proceed with the endoscopic procedure. If you are on blood thinners, please also confirm with your cardiologic clinic that you are able to hold the blood thinner per our recommendations.\"    BLOOD THINNER ORDERS:  -Hold for 48 hours (Xarelto, Eliquis, Pradaxa, Savaysa)  -Hold for 3 days (Pletal)  -Hold for 5 days (Coumadin, Plavix, Brilinta, Aggrenox)  -Hold for 7 days (Effient)     For endocrinology insulin patients:    Please contact your endocrinology clinic for insulin adjustment orders prior to your endoscopic procedure.    4. Read all bowel prep instructions carefully    5. AVOID seeds, nuts, popcorn, raw fruits and vegetables (cooked is okay) for 5 days before procedure    6.   If you start any NEW medication after your visit today, please notify us. Certain medications will need to be held before the procedure, or the procedure cannot be performed.     >>>Please note: if you were prescribed Suprep for the bowel prep and it is too expensive or not covered by insurance, it is okay to substitute Trilyte (or any similar generic prep). This can be done by notifying the pharmacy or calling our office.      ENDOSCOPIC RISK BENEFIT DISCUSSION: I described the procedure in great detail with the patient. I discussed the risks and benefits, including but not limited to: bleeding, perforation, infection, anesthesia complications, and even death. Patient will be NPO after midnight and will have a person physically present at time of pick-up to drive patient home. Patient verbalized understanding and agrees to proceed with procedure as planned.

## 2025-04-03 NOTE — TELEPHONE ENCOUNTER
Scheduled for:  Colonoscopy 14990  Provider Name: Dr. Shore   Date:  4/7/2025  Location:[facility]  Sedation:  MAC  Time:  (Patient is aware that endo/eosc will call with arrival time )  Prep:  Golytely Prep Instructions Given At The Office Visit/ Mychart  Meds/Allergies Reconciled?: CHRISTIANE Serrano Reviewed   Diagnosis with codes:    Constipation K59.00  Lower Abdominal Pain R10.30  Bloating R14.0  + Fit R19.5  Was patient informed to call insurance with codes (Y/N):  Yes  Referral sent?:  Referral was sent at the time of electronic surgical scheduling.  EM or [facility] notified?:  I sent an electronic request to Endo Scheduling and received a confirmation today.  Medication Orders:  Pt is aware to NOT take iron pills, herbal meds and diet supplements for 7 days before exam. Also to NOT take any form of alcohol, recreational drugs and any forms of ED meds 24 hours before exam.   Misc Orders:       Further instructions given by staff:  I provide prep instructions to patient at the time of the appointment/Mychart and reviewed date, and location, she verbalized that she understood and is aware to call if she has any questions.    Language line solution was utilized for patient's visit with CHRISTIANE Serrano.   ( name and ID #)

## 2025-04-04 ENCOUNTER — TELEPHONE (OUTPATIENT)
Facility: CLINIC | Age: 80
End: 2025-04-04

## 2025-04-04 ENCOUNTER — TELEPHONE (OUTPATIENT)
Dept: INTERNAL MEDICINE CLINIC | Facility: CLINIC | Age: 80
End: 2025-04-04

## 2025-04-04 DIAGNOSIS — R10.2 PELVIC PAIN: ICD-10-CM

## 2025-04-04 DIAGNOSIS — R30.0 DYSURIA: Primary | ICD-10-CM

## 2025-04-04 DIAGNOSIS — R35.0 FREQUENT URINATION: ICD-10-CM

## 2025-04-04 RX ORDER — CETIRIZINE HYDROCHLORIDE 10 MG/1
10 TABLET ORAL DAILY
COMMUNITY

## 2025-04-04 RX ORDER — CIPROFLOXACIN 250 MG/1
250 TABLET, FILM COATED ORAL 2 TIMES DAILY
Qty: 10 TABLET | Refills: 0 | Status: SHIPPED | OUTPATIENT
Start: 2025-04-04 | End: 2025-04-09

## 2025-04-04 NOTE — TELEPHONE ENCOUNTER
Urine culture is negative but the urine itself looks like there might be an infection so since she is having symptoms I am going to treat her with antibiotic for few days patient also may need to see urology to make sure she does not need any further workup for urology like cystoscopy will place referral

## 2025-04-04 NOTE — TELEPHONE ENCOUNTER
Left a complete message on the daughter's voicemail, which is the same for the patient (verbal release) - first attempt.   [facility] sent with Dr. Elian Hahn's recommendation and referral information.      Future Appointments   Date Time Provider Department Center   4/7/2025 11:20 AM HITESH, PROCEDURE [facility] None   4/20/2025  7:30 AM [facility] US RM4 VAS [facility] US [facility] [place]   4/28/2025  9:20 AM Behar, Alex, MD PM&R NERY [facility]   4/28/2025  5:00 PM ADO OTF RM1 ADO OTF EM [place]   4/29/2025  7:00 AM [facility] NM RM1 [facility] NM EM [place]   4/29/2025  8:15 AM [facility] CARD RM3 STLAB [facility] NI CARD EM [place]   4/29/2025  9:15 AM [facility] NM RM2 EM[facility] NM [facility] [place]

## 2025-04-04 NOTE — TELEPHONE ENCOUNTER
I received a call from patient daughter Jenn. She stated that patient was seen by the gastroenterologist  and since patient was still having symptoms of UTI CHRISTIANE Serrano order a urine test for patient but inform daughter she will have to follow -up with her PCP. She will like for you to review the urine results.  Patient continues to have lower abd pain 7/10 that is  constant, urine frequency and lower back pain that is more then her usual lower back pain. Daughter denied the patient having a fever or blood in the urine.  She will like to know what is the next step? Please advise    FYI patient will have a colonoscopy on Monday.

## 2025-04-04 NOTE — TELEPHONE ENCOUNTER
I spoke to patient's daughter   States was left a voicemail about arrival time to call back [phone] which got her call over to our office.  I put her on hold and called endoscopy  I was told she has a 1 pm arrival time for a 2 pm procedure.    ANALIA Shore  She also wanted to inform our office that she is currently taking Ciprofloxacin for a UTI.    Thank you

## 2025-04-07 ENCOUNTER — ANESTHESIA EVENT (OUTPATIENT)
Dept: ENDOSCOPY | Facility: HOSPITAL | Age: 80
End: 2025-04-07
Payer: MEDICAID

## 2025-04-07 ENCOUNTER — ANESTHESIA (OUTPATIENT)
Dept: ENDOSCOPY | Facility: HOSPITAL | Age: 80
End: 2025-04-07
Payer: MEDICAID

## 2025-04-07 ENCOUNTER — HOSPITAL ENCOUNTER (OUTPATIENT)
Facility: HOSPITAL | Age: 80
Setting detail: HOSPITAL OUTPATIENT SURGERY
Discharge: HOME | End: 2025-04-07
Attending: INTERNAL MEDICINE | Admitting: INTERNAL MEDICINE
Payer: MEDICAID

## 2025-04-07 VITALS
BODY MASS INDEX: 31.65 KG/M2 | HEIGHT: 62 IN | DIASTOLIC BLOOD PRESSURE: 67 MMHG | HEART RATE: 68 BPM | SYSTOLIC BLOOD PRESSURE: 128 MMHG | RESPIRATION RATE: 13 BRPM | OXYGEN SATURATION: 95 % | WEIGHT: 172 LBS

## 2025-04-07 DIAGNOSIS — K59.00 CONSTIPATION, UNSPECIFIED CONSTIPATION TYPE: ICD-10-CM

## 2025-04-07 DIAGNOSIS — R14.0 BLOATING: ICD-10-CM

## 2025-04-07 DIAGNOSIS — R10.30 LOWER ABDOMINAL PAIN: ICD-10-CM

## 2025-04-07 DIAGNOSIS — R19.5 POSITIVE FIT (FECAL IMMUNOCHEMICAL TEST): ICD-10-CM

## 2025-04-07 PROBLEM — K63.5 POLYP OF COLON: Status: ACTIVE | Noted: 2025-04-07

## 2025-04-07 PROBLEM — C18.9 MALIGNANT NEOPLASM OF COLON (HCC): Status: ACTIVE | Noted: 2025-04-07

## 2025-04-07 PROCEDURE — 0DBN8ZX EXCISION OF SIGMOID COLON, VIA NATURAL OR ARTIFICIAL OPENING ENDOSCOPIC, DIAGNOSTIC: ICD-10-PCS | Performed by: INTERNAL MEDICINE

## 2025-04-07 PROCEDURE — 0DBP8ZX EXCISION OF RECTUM, VIA NATURAL OR ARTIFICIAL OPENING ENDOSCOPIC, DIAGNOSTIC: ICD-10-PCS | Performed by: INTERNAL MEDICINE

## 2025-04-07 PROCEDURE — 45385 COLONOSCOPY W/LESION REMOVAL: CPT | Performed by: INTERNAL MEDICINE

## 2025-04-07 PROCEDURE — 45380 COLONOSCOPY AND BIOPSY: CPT | Performed by: INTERNAL MEDICINE

## 2025-04-07 DEVICE — REPLAY HEMOSTASIS CLIP, 11MM SPAN
Type: IMPLANTABLE DEVICE | Site: COLON | Status: FUNCTIONAL
Brand: REPLAY

## 2025-04-07 RX ORDER — NALOXONE HYDROCHLORIDE 0.4 MG/ML
0.08 INJECTION, SOLUTION INTRAMUSCULAR; INTRAVENOUS; SUBCUTANEOUS ONCE AS NEEDED
Status: DISCONTINUED | OUTPATIENT
Start: 2025-04-07 | End: 2025-04-07

## 2025-04-07 RX ORDER — SODIUM CHLORIDE, SODIUM LACTATE, POTASSIUM CHLORIDE, CALCIUM CHLORIDE 600; 310; 30; 20 MG/100ML; MG/100ML; MG/100ML; MG/100ML
INJECTION, SOLUTION INTRAVENOUS CONTINUOUS
Status: DISCONTINUED | OUTPATIENT
Start: 2025-04-07 | End: 2025-04-07

## 2025-04-07 RX ORDER — LIDOCAINE HYDROCHLORIDE 10 MG/ML
INJECTION, SOLUTION EPIDURAL; INFILTRATION; INTRACAUDAL; PERINEURAL AS NEEDED
Status: DISCONTINUED | OUTPATIENT
Start: 2025-04-07 | End: 2025-04-07 | Stop reason: SURG

## 2025-04-07 RX ADMIN — SODIUM CHLORIDE, SODIUM LACTATE, POTASSIUM CHLORIDE, CALCIUM CHLORIDE: 600; 310; 30; 20 INJECTION, SOLUTION INTRAVENOUS at 13:10:00

## 2025-04-07 RX ADMIN — LIDOCAINE HYDROCHLORIDE 50 MG: 10 INJECTION, SOLUTION EPIDURAL; INFILTRATION; INTRACAUDAL; PERINEURAL at 13:12:00

## 2025-04-07 RX ADMIN — SODIUM CHLORIDE, SODIUM LACTATE, POTASSIUM CHLORIDE, CALCIUM CHLORIDE: 600; 310; 30; 20 INJECTION, SOLUTION INTRAVENOUS at 13:42:00

## 2025-04-07 NOTE — OPERATIVE REPORT
COLONOSCOPY REPORT    Cynthia Mata     3/19/1945 Age 80 year old   PCP Elian Hahn MD Endoscopist Silvia Shore MD       Date of procedure: 25    Procedure: Colonoscopy w/ cold snare polypectomy, biopsies, and clip placement x4    Pre-operative diagnosis: personal hx colon cancer, + FIT test     Post-operative diagnosis: colon polyp, rectal anastomosis, fair prep, abnormal rectal tissue     Medications: MAC    Withdrawal time: 27 minutes    Procedure:  Informed consent was obtained from the patient after the risks of the procedure were discussed, including but not limited to bleeding, perforation, aspiration, infection, or possibility of a missed lesion. After discussions of the risks/benefits and alternatives to this procedure, as well as the planned sedation, the patient was placed in the left lateral decubitus position and begun on continuous blood pressure pulse oximetry and EKG monitoring and this was maintained throughout the procedure. Once an adequate level of sedation was obtained a digital rectal exam was completed. Then the lubricated tip of the [id number] diagnostic video colonoscope was inserted and advanced without difficulty to the cecum using water immersion and CO2 insufflation technique. The cecum was identified by localizing the trifold, the appendix and the ileocecal valve. Withdrawal was begun with thorough washing and careful examination of the colonic walls and folds. A routine second examination of the cecum/ascending colon was performed. Photodocumentation was obtained. The bowel prep was fair. Views of the colon were fair with washing. I then carefully withdrew the instrument from the patient who tolerated the procedure well.     Complications: none.    Findings:   1. One polyp noted as follows:      A. 12 mm polyp in the sigmoid colon; sessile morphology; cold snare polypectomy performed, polyp retrieved. Four clips placed for hemostasis.     2. There was a  surgical anastomosis present in the rectum about 5 cm from the anal verge. There was abnormal-appearing tissue at the site of the anastomosis characterized by polypoid-appearing tissue with small clean-based ulcer. This was biopsied with a cold forceps.     3. Diverticulosis: none.    4. Ileocecal valve appeared healthy and normal. The examined portion of the terminal ileum appeared normal.     5. The colonic mucosa throughout the colon showed normal vascular pattern, without evidence of angioectasias or inflammation.     6. Retroflexion deferred given small rectal vault with post-surgical anatomy. No abnormalities seen on careful forward withdrawal through the anus.     7. RASHI: normal rectal tone, no masses palpated.     Impression:   Surgical anastomosis in the rectum with abnormal-appearing tissue. Suspect granulation tissue with anastomotic ulcer over recurrence of cancer, biopsied.   Fair prep on the left with large amount of fibrous debris.   One 1.2 cm polyp removed. Four clips placed.   The colon was otherwise normal with glistening mucosa and intact vascular pattern.    Recommend:  Await pathology. The interval for the next colonoscopy will be determined after reviewing pathology. If new signs or symptoms develop, colonoscopy may need to be repeated sooner.   High fiber diet.  Monitor for blood in the stool. If having more than just tinge of blood, call office or go to the ER.  Avoid NSAIDs (motrin, ibuprofen, aleve, advil, naproxen, midol, naprosyn, excedrin) for 14 days.     >>>If tissue was obtained and you have not received your pathology results either by phone or letter within 2 weeks, please call our office at [phone].    Specimens: colon polyp, colon biopsies     Blood loss: <1 ml

## 2025-04-07 NOTE — TELEPHONE ENCOUNTER
Spoke with patient's daughter per XIOMARA,  Date of Birth verified.  She was informed of patient lab/ urine result & MD recommendation, she stated understanding.  Urology information provided, she will call for appt for patient.         Future Appointments   Date Time Provider Department Center   4/7/2025 11:20 AM HITESH, PROCEDURE [facility] None   4/20/2025  7:30 AM [facility] US RM4 VAS [facility] US [facility] [place]   4/28/2025  9:20 AM Behar, Alex, MD PM&R NERY [facility]   4/28/2025  5:00 PM ADO OTF RM1 ADO OTF EM [place]   4/29/2025  7:00 AM EMH NM RM1 EM NM EM [place]   4/29/2025  8:15 AM EM[facility] CARD RM3 STLAB [facility] NI CARD EM [place]   4/29/2025  9:15 AM EMH NM RM2 EM NM [facility] [place]

## 2025-04-07 NOTE — H&P
History & Physical Examination    Patient Name: Cynthia Mata  MRN: [id number]  CSN: [id number]  YOB: 1945    Diagnosis: personal hx colon CA s/p resection and radiation therapy, +FIT test     Prescriptions Prior to Admission[1]  No current facility-administered medications for this encounter.       Allergies: Allergies[2]    Past Medical History:    Acid reflux    Anxiety    Arthritis    Cancer (HCC)    Cataract    Colon cancer (HCC)    Colon polyp    Constipation    Esophageal reflux    Essential hypertension    High blood pressure    History of colon cancer    Diagnosed and treated in [facility] with surgery and radiotherapy    Panic attack    Primary osteoarthritis of both knees    Visual impairment     Past Surgical History:   Procedure Laterality Date    Cataract      Colectomy  2019    Colonoscopy      Egd  2014    in [facility]     Family History   Problem Relation Age of Onset    Colon Cancer Self      Social History     Tobacco Use    Smoking status: Never     Passive exposure: Never    Smokeless tobacco: Never   Substance Use Topics    Alcohol use: No       SYSTEM Check if Review is Normal Check if Physical Exam is Normal If not normal, please explain:   HEENT [X ] [ X]    NECK  [X ] [ X]    HEART [X ] [ X]    LUNGS [X ] [ X]    ABDOMEN [X ] [ X]    EXTREMITIES [X ] [ X]    OTHER        I have discussed the risks and benefits and alternatives of the procedure with the patient/family.  They understand and agree to proceed with plan of care.   I have reviewed the History and Physical done within the last 30 days.  Any changes noted above.    Silvia Shore MD                 [1]   Medications Prior to Admission   Medication Sig Dispense Refill Last Dose/Taking    cetirizine 10 MG Oral Tab Take 1 tablet (10 mg total) by mouth daily.   Taking    Omeprazole 40 MG Oral Capsule Delayed Release Take 1 capsule (40 mg total) by mouth daily. 90 capsule 0 Taking    atorvastatin 20 MG Oral Tab Take 1  tablet (20 mg total) by mouth nightly. 90 tablet 1 Taking    PAROXETINE HCL 10 MG Oral Tab TAKE 1 TABLET BY MOUTH EVERY DAY IN THE MORNING 30 tablet 0 Taking    Montelukast Sodium 10 MG Oral Tab Take 1 tablet (10 mg total) by mouth nightly. 30 tablet 3 Taking    ergocalciferol 69368 units Oral Cap Take 1 capsule (50,000 Units total) by mouth once a week.  2 Taking    AmLODIPine Besylate 10 MG Oral Tab   2 Taking    ciprofloxacin 250 MG Oral Tab Take 1 tablet (250 mg total) by mouth 2 (two) times daily for 5 days. 10 tablet 0     [] polyethylene glycol, PEG 3350-KCl-NaBcb-NaCl-NaSulf, 236 g Oral Recon Soln Take 4,000 mL by mouth once for 1 dose. As directed by GI clinic instructions. 4000 mL 0     polyethylene glycol, PEG 3350, 17 GM/SCOOP Oral Powder Take 17 g by mouth daily. 238 g 0     [] sulfamethoxazole-trimethoprim -160 MG Oral Tab per tablet Take 1 tablet by mouth 2 (two) times daily for 5 days. 10 tablet 0     hydroCHLOROthiazide 12.5 MG Oral Cap Take 1 capsule (12.5 mg total) by mouth daily as needed (leg swelling). 90 capsule 3     pantoprazole 40 MG Oral Tab EC Take 1 tablet (40 mg total) by mouth every morning before breakfast. 90 tablet 0     alendronate 70 MG Oral Tab Take 1 tablet (70 mg total) by mouth every 7 days. Take alone on an empty stomach first thing in the morning with at least 240 mL (8 oz) of water. After administration, do not have food, drink, medications, or supplements for at least one half-hour 12 tablet 3     PANTOPRAZOLE 40 MG Oral Tab EC Take 1 tablet by mouth every morning before breakfast. 90 tablet 3     Meclizine HCl 25 MG Oral Tab Take 1 tablet (25 mg total) by mouth 3 (three) times daily as needed. 20 tablet 0     ketoconazole (NIZORAL) 2 % Apply Externally Cream Apply 1 Application topically 2 (two) times daily. 45 g 0    [2] No Known Allergies

## 2025-04-07 NOTE — ANESTHESIA PREPROCEDURE EVALUATION
Anesthesia PreOp Note    HPI:     Cynthia Mata is a 80 year old female who presents for preoperative consultation requested by: Silvia Shore MD    Date of Surgery: 4/7/2025    Procedure(s):  COLONOSCOPY  Indication: Constipation, unspecified constipation type / Lower abdominal pain/ Bloating/ Positive FIT (fecal immunochemical test)    Relevant Problems   No relevant active problems       NPO:  Last Liquid Consumption Date: 04/07/25  Last Liquid Consumption Time: 0830  Last Solid Consumption Date: 04/05/25  Last Solid Consumption Time: 1800  Last Liquid Consumption Date: 04/07/25          History Review:  Patient Active Problem List    Diagnosis Date Noted    Chronic pain of both knees 01/17/2025    History of colon cancer 02/21/2023    Primary osteoarthritis of both knees 02/21/2023    Anxiety 02/21/2023    Subjective tinnitus, left 03/12/2018    Left kidney mass 03/12/2018    Hiatal hernia     Gastroesophageal reflux disease without esophagitis     Globus sensation     Hypertension 12/03/2014       Past Medical History:    Acid reflux    Anxiety    Arthritis    Cancer (HCC)    Cataract    Colon cancer (HCC)    Colon polyp    Constipation    Esophageal reflux    Essential hypertension    High blood pressure    History of colon cancer    Diagnosed and treated in [facility] with surgery and radiotherapy    Panic attack    Primary osteoarthritis of both knees    Visual impairment       Past Surgical History:   Procedure Laterality Date    Cataract      Colectomy  2019    Colonoscopy      Egd  2014    in [facility]       Prescriptions Prior to Admission[1]  Current Medications and Prescriptions Ordered in Epic[2]    Allergies[3]    Family History   Problem Relation Age of Onset    Colon Cancer Self      Social History     Socioeconomic History    Marital status:    Tobacco Use    Smoking status: Never     Passive exposure: Never    Smokeless tobacco: Never   Vaping Use    Vaping status: Never  Used   Substance and Sexual Activity    Alcohol use: No    Drug use: No       Available pre-op labs reviewed.  Lab Results   Component Value Date    WBC 6.6 03/21/2025    RBC 5.07 03/21/2025    HGB 15.4 03/21/2025    HCT 45.5 03/21/2025    MCV 89.7 03/21/2025    MCH 30.4 03/21/2025    MCHC 33.8 03/21/2025    RDW 12.1 03/21/2025    .0 03/21/2025     Lab Results   Component Value Date     03/21/2025    K 4.0 03/21/2025     03/21/2025    CO2 33.0 (H) 03/21/2025    BUN 13 03/21/2025    CREATSERUM 0.81 03/21/2025     (H) 03/21/2025    CA 9.7 03/21/2025          Vital Signs:  Body mass index is 31.46 kg/m².   height is 1.575 m (5' 2\") and weight is 78 kg (172 lb).   Vitals:    04/04/25 0935   Weight: 78 kg (172 lb)   Height: 1.575 m (5' 2\")        Anesthesia Evaluation      No history of anesthetic complications   Airway   Mallampati: II  TM distance: >3 FB  Neck ROM: full  Dental - Dentition appears grossly intact     Pulmonary - normal exam   Cardiovascular - normal exam  (+) hypertension    ECG reviewed    Neuro/Psych    (+)  anxiety/panic attacks,        GI/Hepatic/Renal    (+) GERD    Endo/Other    (+) arthritis  Abdominal   (+) obese                 Anesthesia Plan:   ASA:  3  Plan:   MAC  Plan Comments: family member at bedside to translate.  Informed Consent Plan and Risks Discussed With:  Patient  Blood Product Use Consented    Discussed plan with:  Surgeon      I have informed Cynthia Mata and/or legal guardian or family member of the nature of the anesthetic plan, benefits, risks including possible dental damage if relevant, major complications, and any alternative forms of anesthetic management.   All of the patient's questions were answered to the best of my ability. The patient desires the anesthetic management as planned.  Blanca Rene, CRNA  4/7/2025 12:52 PM  Present on Admission:  **None**           [1]   Medications Prior to Admission   Medication Sig Dispense Refill  Last Dose/Taking    cetirizine 10 MG Oral Tab Take 1 tablet (10 mg total) by mouth daily.   Taking    Omeprazole 40 MG Oral Capsule Delayed Release Take 1 capsule (40 mg total) by mouth daily. 90 capsule 0 Taking    atorvastatin 20 MG Oral Tab Take 1 tablet (20 mg total) by mouth nightly. 90 tablet 1 Taking    PAROXETINE HCL 10 MG Oral Tab TAKE 1 TABLET BY MOUTH EVERY DAY IN THE MORNING 30 tablet 0 Taking    Montelukast Sodium 10 MG Oral Tab Take 1 tablet (10 mg total) by mouth nightly. 30 tablet 3 Taking    ergocalciferol 36189 units Oral Cap Take 1 capsule (50,000 Units total) by mouth once a week.  2 Taking    AmLODIPine Besylate 10 MG Oral Tab   2 Taking    ciprofloxacin 250 MG Oral Tab Take 1 tablet (250 mg total) by mouth 2 (two) times daily for 5 days. 10 tablet 0     [] polyethylene glycol, PEG 3350-KCl-NaBcb-NaCl-NaSulf, 236 g Oral Recon Soln Take 4,000 mL by mouth once for 1 dose. As directed by GI clinic instructions. 4000 mL 0     polyethylene glycol, PEG 3350, 17 GM/SCOOP Oral Powder Take 17 g by mouth daily. 238 g 0     [] sulfamethoxazole-trimethoprim -160 MG Oral Tab per tablet Take 1 tablet by mouth 2 (two) times daily for 5 days. 10 tablet 0     hydroCHLOROthiazide 12.5 MG Oral Cap Take 1 capsule (12.5 mg total) by mouth daily as needed (leg swelling). 90 capsule 3     pantoprazole 40 MG Oral Tab EC Take 1 tablet (40 mg total) by mouth every morning before breakfast. 90 tablet 0     alendronate 70 MG Oral Tab Take 1 tablet (70 mg total) by mouth every 7 days. Take alone on an empty stomach first thing in the morning with at least 240 mL (8 oz) of water. After administration, do not have food, drink, medications, or supplements for at least one half-hour 12 tablet 3     PANTOPRAZOLE 40 MG Oral Tab EC Take 1 tablet by mouth every morning before breakfast. 90 tablet 3     Meclizine HCl 25 MG Oral Tab Take 1 tablet (25 mg total) by mouth 3 (three) times daily as needed. 20 tablet 0      ketoconazole (NIZORAL) 2 % Apply Externally Cream Apply 1 Application topically 2 (two) times daily. 45 g 0    [2]   Current Facility-Administered Medications Ordered in Epic   Medication Dose Route Frequency Provider Last Rate Last Admin    lactated ringers infusion   Intravenous Continuous Silvia Shore MD         No current [facility]-ordered outpatient medications on file.   [3] No Known Allergies

## 2025-04-07 NOTE — ANESTHESIA POSTPROCEDURE EVALUATION
Patient: Cynthia Mata    Procedure Summary       Date: 04/07/25 Room / Location: [facility] ENDOSCOPY 03 / [facility] ENDOSCOPY    Anesthesia Start: 1309 Anesthesia Stop: 1351    Procedure: COLONOSCOPY Diagnosis:       Constipation, unspecified constipation type      Lower abdominal pain      Bloating      Positive FIT (fecal immunochemical test)      (Colon polyp, rectal surgical anstomosis, fair prep)    Surgeons: Silvia Shore MD Anesthesiologist: Blanca López CRNA    Anesthesia Type: MAC ASA Status: 3            Anesthesia Type: No value filed.    Vitals Value Taken Time   /61 04/07/25 1350   Temp 36 04/07/25 1351   Pulse 76 04/07/25 1350   Resp 19 04/07/25 1350   SpO2 94 % 04/07/25 1350   Vitals shown include unfiled device data.    [facility] AN Post Evaluation:   Patient Evaluated in PACU  Patient Participation: complete - patient participated  Level of Consciousness: responsive to verbal stimuli  Pain Score: 0  Pain Management: adequate  Airway Patency:patent  Dental exam unchanged from preop  Yes    Nausea/Vomiting: none  Cardiovascular Status: stable  Respiratory Status: room air and spontaneous ventilation  Postoperative Hydration stable      Blanca López CRNA  4/7/2025 1:51 PM

## 2025-04-07 NOTE — DISCHARGE INSTRUCTIONS
Home Care Instructions for Colonoscopy  Diet:  - Resume your regular diet as tolerated unless otherwise instructed.  - Start with light meals to minimize bloating.  - Do not drink alcohol today.    Medication:  - If you have questions about resuming your normal medications, please contact your Primary Care Physician.    Activities:  - Take it easy today. Do not return to work today.  - Do not drive today.  - Do not operate any machinery today (including kitchen equipment).    Colonoscopy:  - You may notice some rectal \"spotting\" (a little blood on the toilet tissue) for a day or two after the exam. This is normal.  - If you experience any rectal bleeding (not spotting), persistent tenderness or sharp severe abdominal pains, oral temperature over 100 degrees Fahrenheit, light-headedness or dizziness, or any other problems, contact your doctor.      **If unable to reach your doctor, please go to the [facility] Emergency Room**    - Your referring physician will receive a full report of your examination.  - If you do not hear from your doctor's office within two weeks of your biopsy, please call them for your results.    You may be able to see your laboratory results in [facility] between 4 and 7 business days.  In some cases, your physician may not have viewed the results before they are released to [facility].  If you have questions regarding your results contact the physician who ordered the test/exam by phone or via [facility] by choosing \"Ask a Medical Question.\"

## 2025-04-08 NOTE — PROGRESS NOTES
Reviewed results with pt's daughter via telephone.     Maegan: she had this area of proctitis and granulation tissue formation in the rectum at the anastomosis. Also with an ulcer at this area. I would recommend we treat the constipation per your plan. I would like to then take a look again at the area in about 6 months. She is going to [facility] May - December. I would recommend having her follow-up with you in December and then we can schedule a flex sig to reassess the area in the rectum. I would have her do a full bowel prep just so that everything is clean (also had some fibrous debris on the left side of colon so I can better assess there on repeat). Let me know if any questions - thanks!

## 2025-04-09 ENCOUNTER — TELEPHONE (OUTPATIENT)
Facility: CLINIC | Age: 80
End: 2025-04-09

## 2025-04-09 NOTE — TELEPHONE ENCOUNTER
----- Message from Silvia Silviano sent at 4/8/2025  4:17 PM CDT -----  Reviewed results with pt's daughter via telephone.     Maegan: she had this area of proctitis and granulation tissue formation in the rectum at the anastomosis. Also with an ulcer at this area. I would recommend we treat the constipation per your plan. I would like to then take a look again at the area in about 6 months. She is going to [facility] May - December. I would recommend having her follow-up with you in December and then we can schedule a flex sig to reassess the area in the rectum. I would have her do a full bowel prep just so that everything is clean (also had some fibrous debris on the left side of colon so I can better assess there on repeat). Let me know if any questions - thanks!

## 2025-04-09 NOTE — TELEPHONE ENCOUNTER
Clinical staff to schedule pt follow up with me in December please. (Follow up colonoscopy required in 6 months.   Thank you  Maegan

## 2025-04-10 NOTE — TELEPHONE ENCOUNTER
RN called and spoke to pt's daughter Jenn regarding scheduling a clinic appt with [facility] in December. Daughter asked that RN call her in the fall to schedule an appt for Dec. RN to call back as requested.    Message sent to pt outreach to call daughter in October.

## 2025-04-20 ENCOUNTER — HOSPITAL ENCOUNTER (OUTPATIENT)
Dept: ULTRASOUND IMAGING | Facility: HOSPITAL | Age: 80
End: 2025-04-20
Attending: INTERNAL MEDICINE
Payer: MEDICAID

## 2025-04-20 ENCOUNTER — HOSPITAL ENCOUNTER (OUTPATIENT)
Dept: ULTRASOUND IMAGING | Facility: HOSPITAL | Age: 80
Discharge: HOME | End: 2025-04-20
Attending: INTERNAL MEDICINE
Payer: MEDICAID

## 2025-04-20 DIAGNOSIS — R10.84 GENERALIZED ABDOMINAL PAIN: ICD-10-CM

## 2025-04-20 DIAGNOSIS — K59.00 CONSTIPATION, UNSPECIFIED CONSTIPATION TYPE: ICD-10-CM

## 2025-04-20 PROCEDURE — 76700 US EXAM ABDOM COMPLETE: CPT | Performed by: INTERNAL MEDICINE

## 2025-04-25 ENCOUNTER — PATIENT MESSAGE (OUTPATIENT)
Dept: INTERNAL MEDICINE CLINIC | Facility: CLINIC | Age: 80
End: 2025-04-25

## 2025-04-28 ENCOUNTER — OFFICE VISIT (OUTPATIENT)
Dept: PHYSICAL MEDICINE AND REHAB | Facility: CLINIC | Age: 80
End: 2025-04-28
Payer: MEDICAID

## 2025-04-28 ENCOUNTER — HOSPITAL ENCOUNTER (OUTPATIENT)
Dept: MAMMOGRAPHY | Age: 80
Discharge: HOME | End: 2025-04-28
Attending: INTERNAL MEDICINE
Payer: MEDICAID

## 2025-04-28 DIAGNOSIS — M22.2X9 PATELLOFEMORAL PAIN SYNDROME, UNSPECIFIED LATERALITY: Primary | ICD-10-CM

## 2025-04-28 DIAGNOSIS — M17.10 PRIMARY OSTEOARTHRITIS OF KNEE, UNSPECIFIED LATERALITY: ICD-10-CM

## 2025-04-28 DIAGNOSIS — Z12.31 SCREENING MAMMOGRAM FOR BREAST CANCER: ICD-10-CM

## 2025-04-28 PROCEDURE — 77067 SCR MAMMO BI INCL CAD: CPT | Performed by: INTERNAL MEDICINE

## 2025-04-28 PROCEDURE — 77063 BREAST TOMOSYNTHESIS BI: CPT | Performed by: INTERNAL MEDICINE

## 2025-04-28 RX ORDER — LIDOCAINE HYDROCHLORIDE 10 MG/ML
14 INJECTION, SOLUTION INFILTRATION; PERINEURAL ONCE
Status: COMPLETED | OUTPATIENT
Start: 2025-04-28 | End: 2025-04-28

## 2025-04-28 RX ORDER — TRIAMCINOLONE ACETONIDE 40 MG/ML
80 INJECTION, SUSPENSION INTRA-ARTICULAR; INTRAMUSCULAR ONCE
Status: COMPLETED | OUTPATIENT
Start: 2025-04-28 | End: 2025-04-28

## 2025-04-28 NOTE — PROCEDURES
[facility]   Knee Joint Injection Procedure Note    CHIEF COMPLAINT:    Chief Complaint   Patient presents with    Injection     Bilateral knee HA/CSI       PROCEDURE PERFORMED:  BILATERAL knee intra-articular Durolane and corticosteroid injection under ultrasound guidance    INDICATIONS:  BILATERAL knee pain and osteoarthritis    PRIMARY PROCEDURALIST:  Alex Behar, MD    INFORMED CONSENT & TIME OUT:   As documented in the Time Out and Pre-Procedure Check Lists.  Verbal consent was obtained    Vitals: [unfilled]  Labs (document last wbc, plts, hgb, and PT/INR):    Admission on 04/07/2025, Discharged on 04/07/2025   Component Date Value Ref Range Status    Case Report 04/07/2025    Final                    Value:Surgical Pathology                                Case: [id number]                                  Authorizing Provider:  Silvia Shore       Collected:           04/07/2025 01:40 PM                                 MD Yun                                                                 Ordering Location:     [facility]          Received:            04/07/2025 03:47 PM                                 [facility]                                                         Pathologist:           Jhony Thacker MD                                                           Specimens:   A) - Sigmoid colon, Polyp x1                                                                        B) - Rectum, Biopsies                                                                      Final Diagnosis: 04/07/2025    Final                    Value:  A. Sigmoid colon polyp; polypectomy:   Fragments of focally mildly acutely inflamed tubular adenoma (1.2 cm in maximum dimension in aggregate).  No evidence of severe dysplasia/carcinoma in situ or infiltrating carcinoma identified.    B. Rectum; biopsy:   Fragments of rectal mucosa demonstrating moderately  active/acute proctitis with prominent areas of congestive neovascularization, granulation tissue formation, and foci of soft tissue edema.  No evidence of fungal organisms, parasitic organisms, viral inclusions, epithelioid granulomas, increased intraepithelial lymphocytes, epithelial polyps, epithelial dysplasia, or malignancy identified.        Clinical Information 04/07/2025    Final                    Value:K59.00 Constipation, Unspecified Constipation Type.  R10.30 Lower Abdominal Pain.  R14.0 Bloating.  R19.5 Positive Fit (Fecal Immunochemical Test).  Colon polyp, surgical anastomosis and rectum with abnormal appearing tissue.      Gross Description 04/07/2025    Final                    Value:A.  The specimen is received in formalin labeled \"Pirlea, sigmoid colon polyp x1\" and consists of multiple fragments of pink-tan soft tissue measuring in aggregate 1.2 x 0.8 x 0.3 cm. The specimen is submitted entirely in one cassette.    B.  The specimen is received in formalin labeled \"Pirlea, rectal biopsies\" and consists of multiple fragments of pink-tan soft tissue measuring in aggregate 0.8 x 0.5 x 0.1 cm. The specimen is submitted entirely in one cassette.  ([facility])    Jhony Thacker M.D.      Interpretation 04/07/2025 Benign    Final   [facility] Lab Encounter on 04/03/2025   Component Date Value Ref Range Status    Urine Color 04/03/2025 Light-Yellow  Yellow Final    Clarity Urine 04/03/2025 Clear  Clear Final    Spec Gravity 04/03/2025 1.012  1.005 - 1.030 Final    Glucose Urine 04/03/2025 Normal  Normal mg/dL Final    Bilirubin Urine 04/03/2025 Negative  Negative Final    Ketones Urine 04/03/2025 Negative  Negative mg/dL Final    Blood Urine 04/03/2025 1+ (A)  Negative Final    pH Urine 04/03/2025 7.0  5.0 - 8.0 Final    Protein Urine 04/03/2025 Negative  Negative mg/dL Final    Urobilinogen Urine 04/03/2025 Normal  Normal Final    Nitrite Urine 04/03/2025 Negative  Negative Final    Leukocyte Esterase Urine 04/03/2025  75 (A)  Negative Final    WBC Urine 04/03/2025 11-20 (A)  0 - 5 /HPF Final    RBC Urine 04/03/2025 3-5 (A)  0 - 2 /HPF Final    Bacteria Urine 04/03/2025 Rare (A)  None Seen /HPF Final    Squamous Epi. Cells 04/03/2025 Few (A)  None Seen /HPF Final    Renal Tubular Epithelial Cells 04/03/2025 None Seen  None Seen /HPF Final    Transitional Cells 04/03/2025 None Seen  None Seen /HPF Final    Yeast Urine 04/03/2025 None Seen  None Seen /HPF Final    Urine Culture 04/03/2025 No Growth at 18-24 hrs.   Final   [facility] Lab Encounter on 03/27/2025   Component Date Value Ref Range Status    Occult Blood 03/27/2025 Positive (A)  Negative Final   [facility] Lab Encounter on 03/21/2025   Component Date Value Ref Range Status    Ventricular rate 03/21/2025 68  BPM Final    Atrial rate 03/21/2025 68  BPM Final    P-R Interval 03/21/2025 154  ms Final    QRS Duration 03/21/2025 72  ms Final    Q-T Interval 03/21/2025 432  ms Final    QTC Calculation (Bezet) 03/21/2025 459  ms Final    P Axis 03/21/2025 21  degrees Final    R Axis 03/21/2025 19  degrees Final    T Axis 03/21/2025 27  degrees Final   [facility] Lab Encounter on 03/21/2025   Component Date Value Ref Range Status    WBC 03/21/2025 6.6  4.0 - 11.0 x10(3) uL Final    RBC 03/21/2025 5.07  3.80 - 5.30 x10(6)uL Final    HGB 03/21/2025 15.4  12.0 - 16.0 g/dL Final    HCT 03/21/2025 45.5  35.0 - 48.0 % Final    MCV 03/21/2025 89.7  80.0 - 100.0 fL Final    MCH 03/21/2025 30.4  26.0 - 34.0 pg Final    MCHC 03/21/2025 33.8  31.0 - 37.0 g/dL Final    RDW-SD 03/21/2025 39.7  35.1 - 46.3 fL Final    RDW 03/21/2025 12.1  11.0 - 15.0 % Final    PLT 03/21/2025 258.0  150.0 - 450.0 10(3)uL Final    Neutrophil Absolute Prelim 03/21/2025 4.21  1.50 - 7.70 x10 (3) uL Final    Neutrophil Absolute 03/21/2025 4.21  1.50 - 7.70 x10(3) uL Final    Lymphocyte Absolute 03/21/2025 1.49  1.00 - 4.00 x10(3) uL Final    Monocyte Absolute 03/21/2025 0.63  0.10 - 1.00 x10(3) uL Final    Eosinophil Absolute  03/21/2025 0.17  0.00 - 0.70 x10(3) uL Final    Basophil Absolute 03/21/2025 0.05  0.00 - 0.20 x10(3) uL Final    Immature Granulocyte Absolute 03/21/2025 0.01  0.00 - 1.00 x10(3) uL Final    Neutrophil % 03/21/2025 64.1  % Final    Lymphocyte % 03/21/2025 22.7  % Final    Monocyte % 03/21/2025 9.6  % Final    Eosinophil % 03/21/2025 2.6  % Final    Basophil % 03/21/2025 0.8  % Final    Immature Granulocyte % 03/21/2025 0.2  % Final    Glucose 03/21/2025 117 (H)  70 - 99 mg/dL Final    Sodium 03/21/2025 143  136 - 145 mmol/L Final    Potassium 03/21/2025 4.0  3.5 - 5.1 mmol/L Final    Chloride 03/21/2025 104  98 - 112 mmol/L Final    CO2 03/21/2025 33.0 (H)  21.0 - 32.0 mmol/L Final    Anion Gap 03/21/2025 6  0 - 18 mmol/L Final    BUN 03/21/2025 13  9 - 23 mg/dL Final    Creatinine 03/21/2025 0.81  0.55 - 1.02 mg/dL Final    BUN/CREA Ratio 03/21/2025 16.0  10.0 - 20.0 Final    Calcium, Total 03/21/2025 9.7  8.7 - 10.4 mg/dL Final    Calculated Osmolality 03/21/2025 297 (H)  275 - 295 mOsm/kg Final    eGFR-Cr 03/21/2025 73  >=60 mL/min/1.73m2 Final    ALT 03/21/2025 13  10 - 49 U/L Final    AST 03/21/2025 16  <34 U/L Final    Alkaline Phosphatase 03/21/2025 86  55 - 142 U/L Final    Bilirubin, Total 03/21/2025 0.6  0.2 - 1.1 mg/dL Final    Total Protein 03/21/2025 6.9  5.7 - 8.2 g/dL Final    Albumin 03/21/2025 4.5  3.2 - 4.8 g/dL Final    Globulin  03/21/2025 2.4  2.0 - 3.5 g/dL Final    A/G Ratio 03/21/2025 1.9  1.0 - 2.0 Final    Patient Fasting for CMP? 03/21/2025 Yes   Final    Cholesterol, Total 03/21/2025 222 (H)  <200 mg/dL Final    HDL Cholesterol 03/21/2025 66 (H)  40 - 59 mg/dL Final    Triglycerides 03/21/2025 124  30 - 149 mg/dL Final    LDL Cholesterol 03/21/2025 134 (H)  <100 mg/dL Final    VLDL 03/21/2025 23  0 - 30 mg/dL Final    Non HDL Chol 03/21/2025 156 (H)  <130 mg/dL Final    Patient Fasting for Lipid? 03/21/2025 Yes   Final    TSH 03/21/2025 1.464  0.550 - 4.780 uIU/mL Final    Urine Color  03/21/2025 Yellow  Yellow Final    Clarity Urine 03/21/2025 Clear  Clear Final    Spec Gravity 03/21/2025 1.017  1.005 - 1.030 Final    Glucose Urine 03/21/2025 Normal  Normal mg/dL Final    Bilirubin Urine 03/21/2025 Negative  Negative Final    Ketones Urine 03/21/2025 Negative  Negative mg/dL Final    Blood Urine 03/21/2025 1+ (A)  Negative Final    pH Urine 03/21/2025 7.0  5.0 - 8.0 Final    Protein Urine 03/21/2025 Trace (A)  Negative mg/dL Final    Urobilinogen Urine 03/21/2025 Normal  Normal Final    Nitrite Urine 03/21/2025 Negative  Negative Final    Leukocyte Esterase Urine 03/21/2025 500 (A)  Negative Final    WBC Urine 03/21/2025 21-50 (A)  0 - 5 /HPF Final    RBC Urine 03/21/2025 6-10 (A)  0 - 2 /HPF Final    Bacteria Urine 03/21/2025 None Seen  None Seen /HPF Final    Squamous Epi. Cells 03/21/2025 Few (A)  None Seen /HPF Final    Renal Tubular Epithelial Cells 03/21/2025 None Seen  None Seen /HPF Final    Transitional Cells 03/21/2025 Few (A)  None Seen /HPF Final    Yeast Urine 03/21/2025 None Seen  None Seen /HPF Final    HgbA1C 03/21/2025 5.8 (H)  <5.7 % Final    Estimated Average Glucose 03/21/2025 120  68 - 126 mg/dL Final   ]    PROCEDURE:    LEFT knee:    PROCEDURE:  The procedure, risks, benefits and alternatives were discussed with the patient.  Patient verbalized understanding and gave consent.  The LEFT knee was prepped and draped in the usual sterile fashion. Using a linear ultrasound probe, the superolateral patella-femoral joint space was visualized. Using a 30-gauge, 1/2-inch needle, 1 cc of 1% lidocaine was used to numb the skin and soft tissues in the superolateral approach.  The intra-articular space was then accessed using an 18-gauge, 1-1/2-inch needle, which was visualized on ultrasound, using approximately 5 cc of 1% lidocaine to numb the soft tissue.  Once the intra-articular space was visualized on ultrasound, with the needle accessing the space, the syringe was traded for an  empty 10 cc syringe and aspiration was attempted. 2.5 cc of serous straw colored fluid was removed from the LEFT knee. The Durolane  injection was attached to the needle and injected. During the injection, the Durolane was noted to enter the intra-articular space. The Durolane syringe was then switched out for a syringe containing 2 cc of 1% lidocaine and 1 cc of 40 mg/cc triamcinolone which was injected into the same space. The needle was then removed and hemostasis was achieved.  Skin was cleansed and a dry dressing was placed.    Patient tolerated the procedure well and no immediate complications noted.     RIGHT knee:    PROCEDURE:  The procedure, risks, benefits and alternatives were discussed with the patient.  Patient verbalized understanding and gave consent.  The RIGHT knee was prepped and draped in the usual sterile fashion. Using a linear ultrasound probe, the superolateral patella-femoral joint space was visualized. Using a 30-gauge, 1/2-inch needle, 1 cc of 1% lidocaine was used to numb the skin and soft tissues in the superolateral approach.  The intra-articular space was then accessed using an 18-gauge, 1-1/2-inch needle, which was visualized on ultrasound, using approximately 5 cc of 1% lidocaine to numb the soft tissue.  Once the intra-articular space was visualized on ultrasound, with the needle accessing the space, the syringe was traded for an empty 10 cc syringe and aspiration was attempted. 5 cc of serous straw colored fluid was removed from the RIGHT knee. The Durolane injection was attached to the needle and injected. During the injection, the Durolane was noted to enter the intra-articular space. The Durolane syringe was then switched out for a syringe containing 2 cc of 1% lidocaine and 1 cc of 40 mg/cc triamcinolone which was injected into the same space. The needle was then removed and hemostasis was achieved.  Skin was cleansed and a dry dressing was placed.      Patient tolerated the  procedure well and no immediate complications noted.       Patient verbalized understanding of assessment and plan.  Patient is in agreement with the plan.  All questions were answered.  No barriers to learning identified. Permanent pictures were saved in our PACS systeme.       INSTRUCTIONS GIVEN TO PATIENT:    \"You will see an effect in the next 2-3 days.  Please contact me if you have fevers, worsening swelling, worsening pain, decreased range of motion, increased redness, chills, or anything that makes you concerned about how the joint we injected feels/looks.  If you do not reach me in a reasonable time, please report directly to the emergency room for further evaluation\"    2 months     Alex B. Behar MD, [facility] & CAM  Physical Medicine and Rehabilitation/Sports Medicine  [facility]

## 2025-04-28 NOTE — PATIENT INSTRUCTIONS
Post Injection Instructions     Please do not do anything strenuous over the next two days (if you had a knee injection do not walk more than 2 city blocks, do not attend any aerobic classes, do not run, no heavy lifting, no prolong standing).  You may resume your day to day activities after your injection.  You may experience some mild amount of swelling after the procedure.  Please ice your joint that was injected at least 5-6 times a day (15 minutes) for two days after (this will help prevent worsening pain that sometimes occurs after an injection).  Only take tylenol if needed for pain for the first few days.  Watch for signs of infection which include redness, warmth, worsening pain, fevers or chills.  If you develop any of these signs call the office immediately at [phone]    Everyone responds differently to injections, but you can expect your peak effects a few weeks after your last injection.  Alex B. Behar MD  Physical Medicine and Rehabilitation/Sports Medicine  [facility]

## 2025-05-01 ENCOUNTER — PATIENT MESSAGE (OUTPATIENT)
Facility: CLINIC | Age: 80
End: 2025-05-01

## 2025-05-01 NOTE — TELEPHONE ENCOUNTER
>>Maegan, Please review patient's daughter, Jenn, below. She is leaving the country May 12th. Would you like to see her before she leaves. Please also see imaging results management, 4/20/25. Thank you.   ADULT       What are the side effects?  Side effects depend on the medication used, and may not even be present.    Most Common Sometimes   Irritability  Poor Balance  Sleeping for Several Hours  Drowsiness  Fatigue  Difficulty Concentrating Change in Behavior  Hyperactivity  Nausea (upset stomach)  Gas (flatulence)  Dizziness  Hiccups  Constipation  Blurred Vision     The effects of sedation medicine can last up to 24 hours.  You may be drowsy or irritable for 2 to 8 hours after receiving medicine.  You may need to sleep after leaving the testing area.  Sleeping after sedation will help reduce irritability.  Diet:  Do not give anything to eat or drink until you are fully awake.  Eat a light meal and advance to a normal diet unless instructed differently:   Do not drink alcohol beverages for the next 24 hours.  Avoid greasy or spicy foods today.  Activity:  You may be dizzy and/or unsteady.  Ask for help walking, using the bathroom, or stairs to protect yourself from injury.  You should not drive a vehicle, operate machinery or power tools for 24 hours because your reflexes may be slow and your vision may be blurry.  Do not sign any legally binding documents or make important decisions for 24 hours after receiving sedation.  Medications:   Unless told otherwise, do not take any non-prescription medications (cold medicine, etc.) for 24 hours, as these medications in combination with sedation medication, can cause increased drowsiness.  If you feel that you need over the counter medication, discuss with your physician.      When Should I Call the Doctor?  Vomiting more than twice.  Extreme irritability or unusual changes in behavior.  Trouble arousing.  Inability to urinate.  Trouble breathing - call 911.    We would like to take this opportunity to thank you. Because your confidence in your caregivers is very important to us, it is our commitment to always treat you and your family with courtesy and respect. Our goal  is to always answer any questions or worries or concerns you may have about the care you received.  If you have any suggestions for improvement or worries or concerns please do not hesitate to let us know.

## 2025-05-05 ENCOUNTER — HOSPITAL ENCOUNTER (OUTPATIENT)
Dept: CT IMAGING | Facility: HOSPITAL | Age: 80
Discharge: HOME | End: 2025-05-05
Attending: INTERNAL MEDICINE
Payer: MEDICAID

## 2025-05-05 DIAGNOSIS — K80.80 BILIARY CALCULUS OF OTHER SITE WITHOUT OBSTRUCTION: ICD-10-CM

## 2025-05-05 DIAGNOSIS — N28.9 RENAL LESION: Primary | ICD-10-CM

## 2025-05-05 PROCEDURE — 82565 ASSAY OF CREATININE: CPT

## 2025-05-05 PROCEDURE — 74160 CT ABDOMEN W/CONTRAST: CPT | Performed by: INTERNAL MEDICINE

## 2025-05-06 ENCOUNTER — HOSPITAL ENCOUNTER (OUTPATIENT)
Dept: CV DIAGNOSTICS | Facility: HOSPITAL | Age: 80
Discharge: HOME | End: 2025-05-06
Attending: INTERNAL MEDICINE
Payer: MEDICAID

## 2025-05-06 ENCOUNTER — TELEPHONE (OUTPATIENT)
Dept: INTERNAL MEDICINE CLINIC | Facility: CLINIC | Age: 80
End: 2025-05-06

## 2025-05-06 ENCOUNTER — HOSPITAL ENCOUNTER (OUTPATIENT)
Dept: NUCLEAR MEDICINE | Facility: HOSPITAL | Age: 80
Discharge: HOME | End: 2025-05-06
Attending: INTERNAL MEDICINE
Payer: MEDICAID

## 2025-05-06 DIAGNOSIS — R94.31 ABNORMAL EKG: ICD-10-CM

## 2025-05-06 DIAGNOSIS — E78.5 HYPERLIPIDEMIA, UNSPECIFIED HYPERLIPIDEMIA TYPE: ICD-10-CM

## 2025-05-06 DIAGNOSIS — N28.9 RENAL LESION: Primary | ICD-10-CM

## 2025-05-06 LAB
CREAT BLD-MCNC: 0.9 MG/DL (ref 0.55–1.02)
EGFRCR SERPLBLD CKD-EPI 2021: 64 ML/MIN/1.73M2 (ref 60–?)

## 2025-05-06 PROCEDURE — 78452 HT MUSCLE IMAGE SPECT MULT: CPT | Performed by: INTERNAL MEDICINE

## 2025-05-06 PROCEDURE — 93017 CV STRESS TEST TRACING ONLY: CPT | Performed by: INTERNAL MEDICINE

## 2025-05-06 PROCEDURE — 93018 CV STRESS TEST I&R ONLY: CPT | Performed by: INTERNAL MEDICINE

## 2025-05-06 PROCEDURE — 93016 CV STRESS TEST SUPVJ ONLY: CPT | Performed by: INTERNAL MEDICINE

## 2025-05-06 RX ORDER — REGADENOSON 0.08 MG/ML
INJECTION, SOLUTION INTRAVENOUS
Status: COMPLETED
Start: 2025-05-06 | End: 2025-05-06

## 2025-05-06 RX ADMIN — REGADENOSON 0.4 MG: 0.08 INJECTION, SOLUTION INTRAVENOUS at 09:29:00

## 2025-05-06 NOTE — IMAGING NOTE
Used Congolese Language line  for instructions and explanation about Regadenoson stress test,possible side effects  Alexander ID # [id number] -language line   Patient verbalized understanding. Digital Consent signed by patient

## 2025-05-06 NOTE — TELEPHONE ENCOUNTER
Advised daughter Jenn(on HIPAA)  of Dr. Hahn's note. Daughter verbalized understanding and conferenced call with scheduling. Was offered appointment for tomorrow or today but declined. MRI scheduled for 5/8/2025 at 10am at [facility]. Daughter advised by  that patient is to be nothing by mouth for 4 hours prior to test. Daughter verbalized understanding.

## 2025-05-06 NOTE — TELEPHONE ENCOUNTER
Per CT results below patient needs MRI, patient is leaving the country next week and dtr is asking if ok to wait on this until she gets back in late Nov or December to do MRI?   Please advise.   Dtr Understand you will not place urgent order/referral for MRI although dtr not sure if can wait until later this year or how to proceed with travel plans    Elian Hahn MD  5/5/2025  6:14 PM CDT       No evidence of acute abnormality in the abdomen.      Previously described cholelithiasis not well delineated on the CT examination possibly relating to isodense small gallstones.  No significant gallbladder wall thickening.   Radiologist again cannot identify the lesion of kidney they are recommending MRI see below  Incompletely characterized 0.9 cm exophytic lesion lower pole left kidney.  Advise follow up nonemergent MRI of the abdomen without with contrast.  Will place order this is not emergent

## 2025-05-07 LAB
% OF MAX PREDICTED HR: 100 %
MAX DIASTOLIC BP: 71 MMHG
MAX HEART RATE: 105 BPM
MAX PREDICTED HEART RATE: 139 BPM
MAX SYSTOLIC BP: 151 MMHG
MAX WORK LOAD: 10

## 2025-05-08 ENCOUNTER — TELEPHONE (OUTPATIENT)
Dept: CASE MANAGEMENT | Age: 80
End: 2025-05-08

## 2025-05-08 DIAGNOSIS — N28.9 RENAL LESION: Primary | ICD-10-CM

## 2025-05-08 NOTE — TELEPHONE ENCOUNTER
Ct Abdomen        Status: DENIED        Reference number  [id number]     A copy of the denial letter is filed under the MEDIA tab, reference for complete details. You may reach out to [employer] at [phone] to discuss decision.     The insurance want to approved [id number] CT Abdomen with & with out contrast. You have the right to speak to MD Review to over turn their decision.           MRI Abdomen        Status: DENIED        Reference number [id number]      A copy of the denial letter is filed under the MEDIA tab, reference for complete details. You may reach [facility] to [facility] at [phone] to discuss decision.          Thank you

## 2025-05-08 NOTE — TELEPHONE ENCOUNTER
Maybe can get it done back home or when she is back, also will refer to urology to follow and maybe they can decide if pt needs MRI and then they might be better at ordering if need

## 2025-05-09 NOTE — TELEPHONE ENCOUNTER
I spoke with tyron all questions answered pt will try to do the MRI when she returns back from [facility], also I forgot yo tell her this pleas eas the daughter to try tp schedule her to see the Urologist as soon as she comes back and then they can decide if she needs to test doen so she can call now to schedule for when she is back

## (undated) DEVICE — KIT ENDO ORCAPOD 160/180/190

## (undated) DEVICE — LASSO POLYPECTOMY SNARE: Brand: LASSO

## (undated) DEVICE — GIJAW SINGLE-USE BIOPSY FORCEPS WITH NEEDLE: Brand: GIJAW

## (undated) DEVICE — V2 SPECIMEN COLLECTION MANIFOLD KIT: Brand: NEPTUNE

## (undated) DEVICE — Device: Brand: DEFENDO AIR/WATER/SUCTION AND BIOPSY VALVE

## (undated) DEVICE — 60 ML SYRINGE REGULAR TIP: Brand: MONOJECT

## (undated) DEVICE — FORCEP RADIAL JAW 4

## (undated) DEVICE — V2 SPECIMEN COLLECTION TRAY: Brand: NEPTUNE

## (undated) DEVICE — Device

## (undated) DEVICE — ENDOSCOPY PACK UPPER: Brand: MEDLINE INDUSTRIES, INC.

## (undated) DEVICE — KIT CLEAN ENDOKIT 1.1OZ GOWNX2

## (undated) NOTE — LETTER
3/11/2018              [address] [place], [place] [address]        [facility]  [address]         Dear Sadiq Bates,    I reviewed the pathology report from the biopsies done during your recent upper endoscopy. Based on the report, you have NO evidence of H. p

## (undated) NOTE — LETTER
Seferino Mcneal, [address]       03/16/18        Patient: Jimmie Tran   YOB: 1945   Date of Visit: 3/16/2018       Dear  Dr. Goldie Ambrose MD,      Thank you for referring Jimmie Tran to my practice.

## (undated) NOTE — ED AVS SNAPSHOT
[facility] Emergency Department    [address]     [address] [place] [address]    Phone:  [phone]    Fax:  [phone]           Ismael Johnson   MRN: [id number]    Department:  [facility] Emergency Department   Date of Visit:  2/1 - loratadine 10 MG Tabs            Discharge References/Attachments     BRONCHITIS, ANTIOBIOTIC TREATMENT (ADULT) (ENGLISH)    ALLERGIC RHINITIS (ENGLISH)      Disclosure     Insurance plans vary and the physician(s) referred by the ER may not be covered b IF THERE IS ANY CHANGE OR WORSENING OF YOUR CONDITION, CALL YOUR PRIMARY CARE PHYSICIAN AT ONCE OR RETURN IMMEDIATELY TO THE EMERGENCY DEPARTMENT.     If you have been prescribed any medication(s), please fill your prescription right away and begin taking t - If you don’t have insurance, [facility] has partnered with [facility] [employer] to help you get signed up for insurance coverage.   [employer] [facility] [employer] is a Federal Navigator program that can help with your Affordable Care Act cover

## (undated) NOTE — LETTER
AUTHORIZATION FOR SURGICAL OPERATION OR OTHER PROCEDURE    1. I hereby authorize Dr. Alex Behar and the [facility] Office staff assigned to my case to perform the following operation and/or procedure at the [facility] Office:    BILATERAL knee HA and CSI under US     2.  My physician has explained the nature and purpose of the operation or other procedure, possible alternative methods of treatment, the risks involved, and the possibility of complication to me.  I acknowledge that no guarantee has been made as to the result that may be obtained.  3.  I recognize that, during the course of this operation, or other procedure, unforseen conditions may necessitate additional or different procedure than those listed above.  I, therefore, further authorize and request that the above named physician, his/her physician assistants or designees perform such procedures as are, in his/her professional opinion, necessary and desirable.  4.  Any tissue or organs removed in the operation or other procedure may be disposed of by and at the discretion of the [facility] staff and [facility].  5.  I understand that in the event of a medical emergency, I will be transported by local paramedics to [facility] or other hospital emergency department.  6.  I certify that I have read and fully understand the above consent to operation and/or other procedure.    7.  I acknowledge that my physician has explained sedation/analgesia administration to me including the risks and benefits.  I consent to the administration of sedation/analgesia as may be necessary or desirable in the judgement of my physician.    Witness signature: ___________________________________________________ Date:  ______/______/_____                    Time:  ________ A.M.  P.M.       Patient Name:    Cynthia Mata  3/19/1945  [id number]       Patient signature:  ___________________________________________________               Statement of  Physician  My signature below affirms that prior to the time of the procedure, I have explained to the patient and/or his/her guardian, the risks and benefits involved in the proposed treatment and any reasonable alternative to the proposed treatment.  I have also explained the risks and benefits involved in the refusal of the proposed treatment and have answered the patient's questions.                        Date:  ______/______/_______  Provider                      Signature:  __________________________________________________________       Time:  ___________ A.M    P.M.

## (undated) NOTE — LETTER
AUTHORIZATION FOR SURGICAL OPERATION OR OTHER PROCEDURE    1. I hereby authorize Dr. Harry Gonzales, and [facility] [facility] staff assigned to my case to perform the following operation and/or procedure at the [facility] [facility]:     Bilateral Knee Injection    2. My physician has explained the nature and purpose of the operation or other procedure, possible alternative methods of treatment, the risks involved, and the possibility of complication to me. I acknowledge that no guarantee has been made as to the result that may be obtained. 3.  I recognize that, during the course of this operation, or other procedure, unforseen conditions may necessitate additional or different procedure than those listed above. I, therefore, further authorize and request that the above named physician, his/her physician assistants or designees perform such procedures as are, in his/her professional opinion, necessary and desirable. 4.  Any tissue or organs removed in the operation or other procedure may be disposed of by and at the discretion of the [facility] and [facility]. 5.  I understand that in the event of a medical emergency, I will be transported by local paramedics to [facility] or other hospital emergency department. 6.  I certify that I have read and fully understand the above consent to operation and/or other procedure. 7.  I acknowledge that my physician has explained sedation/analgesia administration to me including the risks and benefits. I consent to the administration of sedation/analgesia as may be necessary or desirable in the judgement of my physician. Witness signature: ___________________________________________________ Date:  ______/______/_____                    Time:  ________ A. M.  P.M.        Patient Name:  ______________________________________________________  (please print)      Patient signature: ___________________________________________________             Relationship to Patient:           []  Parent    Responsible person                          []  Spouse  In case of minor or                    [] Other  _____________   Incompetent name:  __________________________________________________                               (please print)      _____________      Responsible person  In case of minor or  Incompetent signature:  _______________________________________________    Statement of Physician  My signature below affirms that prior to the time of the procedure, I have explained to the patient and/or his/her guardian, the risks and benefits involved in the proposed treatment and any reasonable alternative to the proposed treatment. I have also explained the risks and benefits involved in the refusal of the proposed treatment and have answered the patient's questions.                         Date:  ______/______/_______  Provider                      Signature:  __________________________________________________________       Time:  ___________ A.M    P.M.

## (undated) NOTE — LETTER
[place] [address], [place], [place]    Authorization for Surgical Operation and Procedure                               I hereby authorize Silvia Shore MD, my physician and his/her assistants (if applicable), which may include medical students, residents, and/or fellows, to perform the following surgical operation/ procedure and administer such anesthesia as may be determined necessary by my physician: Operation/Procedure name (s) COLONOSCOPY on Constanta Pirlea   2.   I recognize that during the surgical operation/procedure, unforeseen conditions may necessitate additional or different procedures than those listed above.  I, therefore, further authorize and request that the above-named surgeon, assistants, or designees perform such procedures as are, in their judgment, necessary and desirable.    3.   My surgeon/physician has discussed prior to my surgery the potential benefits, risks and side effects of this procedure; the likelihood of achieving goals; and potential problems that might occur during recuperation.  They also discussed reasonable alternatives to the procedure, including risks, benefits, and side effects related to the alternatives and risks related to not receiving this procedure.  I have had all my questions answered and I acknowledge that no guarantee has been made as to the result that may be obtained.    4.   Should the need arise during my operation/procedure, which includes change of level of care prior to discharge, I also consent to the administration of blood and/or blood products.  Further, I understand that despite careful testing and screening of blood or blood products by collecting agencies, I may still be subject to ill effects as a result of receiving a blood transfusion and/or blood products.  The following are some, but not all, of the potential risks that can occur: fever and allergic reactions, hemolytic reactions, transmission of  diseases such as Hepatitis, AIDS and Cytomegalovirus (CMV) and fluid overload.  In the event that I wish to have an autologous transfusion of my own blood, or a directed donor transfusion, I will discuss this with my physician.  Check only if Refusing Blood or Blood Products  I understand refusal of blood or blood products as deemed necessary by my physician may have serious consequences to my condition to include possible death. I hereby assume responsibility for my refusal and release the hospital, its personnel, and my physicians from any responsibility for the consequences of my refusal.    o  Refuse   5.   I authorize the use of any specimen, organs, tissues, body parts or foreign objects that may be removed from my body during the operation/procedure for diagnosis, research or teaching purposes and their subsequent disposal by hospital authorities.  I also authorize the release of specimen test results and/or written reports to my treating physician on the hospital medical staff or other referring or consulting physicians involved in my care, at the discretion of the Pathologist or my treating physician.    6.   I consent to the photographing or videotaping of the operations or procedures to be performed, including appropriate portions of my body for medical, scientific, or educational purposes, provided my identity is not revealed by the pictures or by descriptive texts accompanying them.  If the procedure has been photographed/videotaped, the surgeon will obtain the original picture, image, videotape or CD.  The hospital will not be responsible for storage, release or maintenance of the picture, image, tape or CD.    7.   I consent to the presence of a  or observers in the operating room as deemed necessary by my physician or their designees.    8.   I recognize that in the event my procedure results in extended X-Ray/fluoroscopy time, I may develop a skin reaction.    9. If I have a Do Not  Attempt Resuscitation (DNAR) order in place, that status will be suspended while in the operating room, procedural suite, and during the recovery period unless otherwise explicitly stated by me (or a person authorized to consent on my behalf). The surgeon or my attending physician will determine when the applicable recovery period ends for purposes of reinstating the DNAR order.  10. Patients having a sterilization procedure: I understand that if the procedure is successful the results will be permanent and it will therefore be impossible for me to inseminate, conceive, or bear children.  I also understand that the procedure is intended to result in sterility, although the result has not been guaranteed.   11. I acknowledge that my physician has explained sedation/analgesia administration to me including the risk and benefits I consent to the administration of sedation/analgesia as may be necessary or desirable in the judgment of my physician.    I CERTIFY THAT I HAVE READ AND FULLY UNDERSTAND THE ABOVE CONSENT TO OPERATION and/or OTHER PROCEDURE.     ____________________________________  _________________________________        ______________________________  Signature of Patient    Signature of Responsible Person                Printed Name of Responsible Person                                      ____________________________________  _____________________________                ________________________________  Signature of Witness        Date  Time         Relationship to Patient    STATEMENT OF PHYSICIAN My signature below affirms that prior to the time of the procedure; I have explained to the patient and/or his/her legal representative, the risks and benefits involved in the proposed treatment and any reasonable alternative to the proposed treatment. I have also explained the risks and benefits involved in refusal of the proposed treatment and alternatives to the proposed treatment and have answered the  patient's questions. If I have a significant financial interest in a co-management agreement or a significant financial interest in any product or implant, or other significant relationship used in this procedure/surgery, I have disclosed this and had a discussion with my patient.     _____________________________________________________              _____________________________  (Signature of Physician)                                                                                         (Date)                                   (Time)  Patient Name: Cynthia Mata      : 3/19/1945      Printed: 2025     Medical Record #: [id number]                                      Page 1 of 1

## (undated) NOTE — ED AVS SNAPSHOT
[facility] Emergency Department    [place] [address]     [address] [place] [address]    Phone:  [phone]    Fax:  [phone]           Aaron Prince   MRN: [id number]    Department:  [facility] Emergency Department   Date of Visit:  2/2 and Class Registration line at [phone] or find a doctor online by visiting [email or web address].    IF THERE IS ANY CHANGE OR WORSENING OF YOUR CONDITION, CALL YOUR PRIMARY CARE PHYSICIAN AT ONCE OR RETURN IMMEDIATELY TO [address] [place] [address].     If

## (undated) NOTE — ED AVS SNAPSHOT
Ismael Johnson   MRN: [id number]    Department:  [facility] Emergency Department   Date of Visit:  3/9/2018           Disclosure     Insurance plans vary and the physician(s) referred by the ER may not be covered by your plan.  Please contact within the next three months to obtain basic health screening including reassessment of your blood pressure.     IF THERE IS ANY CHANGE OR WORSENING OF YOUR CONDITION, CALL YOUR PRIMARY CARE PHYSICIAN AT ONCE OR RETURN IMMEDIATELY TO THE EMERGENCY DEPARTMEN

## (undated) NOTE — ED AVS SNAPSHOT
Maritza Johnson   MRN: [id number]    Department:  [facility] Emergency Department   Date of Visit:  3/5/2018           Disclosure     Insurance plans vary and the physician(s) referred by the ER may not be covered by your plan.  Please contact within the next three months to obtain basic health screening including reassessment of your blood pressure.     IF THERE IS ANY CHANGE OR WORSENING OF YOUR CONDITION, CALL YOUR PRIMARY CARE PHYSICIAN AT ONCE OR RETURN IMMEDIATELY TO THE EMERGENCY DEPARTMEN

## (undated) NOTE — LETTER
Date: 2025      Patient Name: Cynthia Mata      : 3/19/1945        Thank you for choosing [facility] as your health care provider. Your physician has deemed the following medical service(s) necessary. However, your insurance plan may not pay for all of your health care and costs and may deny payment for this service. The fact that your insurance plan does not pay for an item or service does not mean you should not receive it. The purpose of this form is to help you make an informed decision about whether or not you want to receive this service(s) that may not be paid for by your insurance plan.    CPT Code Description     Cost     BILATERAL knee HA and CSI under US       I understand that the above mentioned service(s) or supply may not be covered by my insurance company. I agree to be financially responsible for the cost of this service or supply in the event of my insurance denies payment as a non-covered benefit.        ______________________________________________________________________  Signature of Patient or Patient's Representative  Relationship  Date    ______________________________________________________________________  Signature of Witness to signing of form   Printed Name

## (undated) NOTE — ED AVS SNAPSHOT
[facility] Emergency Department    [place] [address]    Phone:  [phone]    Fax:  [phone]           Mikal Holm   MRN: [id number]    Department:  [facility] Emergency Department   Date of Visit:  2/1 and Class Registration line at [phone] or find a doctor online by visiting [email or web address].    IF THERE IS ANY CHANGE OR WORSENING OF YOUR CONDITION, CALL YOUR PRIMARY CARE PHYSICIAN AT ONCE OR RETURN IMMEDIATELY TO [address] [place] [address].     If

## (undated) NOTE — ED AVS SNAPSHOT
Gorge Richard   MRN: [id number]    Department:  [facility] Emergency Department   Date of Visit:  1/19/2018           Disclosure     Insurance plans vary and the physician(s) referred by the ER may not be covered by your plan.  Please contact within the next three months to obtain basic health screening including reassessment of your blood pressure.     IF THERE IS ANY CHANGE OR WORSENING OF YOUR CONDITION, CALL YOUR PRIMARY CARE PHYSICIAN AT ONCE OR RETURN IMMEDIATELY TO THE EMERGENCY DEPARTMEN

## (undated) NOTE — LETTER
[facility]  Administration of Anesthesia  Cynthia TOMPKINS agree to be cared for by a physician anesthesiologist alone and/or with a nurse anesthetist, who is specially trained to monitor me and give me medicine to put me to sleep or keep me comfortable during my procedure    I understand that my anesthesiologist and/or anesthetist is not an employee or agent of [facility] or [employer]. He or she works for [facility]    As the patient asking for anesthesia services, I agree to:  Allow the anesthesiologist (anesthesia doctor) to give me medicine and do additional procedures as necessary. Some examples are: Starting or using an “IV” to give me medicine, fluids or blood during my procedure, and having a breathing tube placed to help me breathe when I’m asleep (intubation). In the event that my heart stops working properly, I understand that my anesthesiologist will make every effort to sustain my life, unless otherwise directed by [facility] Do Not Resuscitate documents.  Tell my anesthesia doctor before my procedure:  If I am pregnant.  The last time that I ate or drank.  iii. All of the medicines I take (including prescriptions, herbal supplements, and pills I can buy without a prescription (including street drugs/illegal medications). Failure to inform my anesthesiologist about these medicines may increase my risk of anesthetic complications.  iv.If I am allergic to anything or have had a reaction to anesthesia before.  I understand how the anesthesia medicine will help me (benefits).  I understand that with any type of anesthesia medicine there are risks:  The most common risks are: nausea, vomiting, sore throat, muscle soreness, damage to my eyes, mouth, or teeth (from breathing tube placement).  Rare risks include: remembering what happened during my procedure, allergic reactions to medications, injury to my airway, heart, lungs, vision, nerves, or  muscles and in extremely rare instances death.  My doctor has explained to me other choices available to me for my care (alternatives).  Pregnant Patients (“epidural”):  I understand that the risks of having an epidural (medicine given into my back to help control pain during labor), include itching, low blood pressure, difficulty urinating, headache or slowing of the baby’s heart. Very rare risks include infection, bleeding, seizure, irregular heart rhythms and nerve injury.  Regional Anesthesia (“spinal”, “epidural”, & “nerve blocks”):  I understand that rare but potential complications include headache, bleeding, infection, seizure, irregular heart rhythms, and nerve injury.    _____________________________________________________________________________  Patient (or Representative) Signature/Relationship to Patient  Date   Time    _____________________________________________________________________________   Name (if used)    Language/Organization   Time    _____________________________________________________________________________  Nurse Anesthetist Signature     Date   Time  _____________________________________________________________________________  Anesthesiologist Signature     Date   Time  I have discussed the procedure and information above with the patient (or patient’s representative) and answered their questions. The patient or their representative has agreed to have anesthesia services.    _____________________________________________________________________________  Witness        Date   Time  I have verified that the signature is that of the patient or patient’s representative, and that it was signed before the procedure  Patient Name: Cynthia Mata     : 3/19/1945                 Printed: 2025 at 8:27 AM    Medical Record #: [id number]                                            Page 1 of 1  ----------ANESTHESIA CONSENT----------